# Patient Record
Sex: MALE | Race: WHITE | NOT HISPANIC OR LATINO | Employment: OTHER | URBAN - METROPOLITAN AREA
[De-identification: names, ages, dates, MRNs, and addresses within clinical notes are randomized per-mention and may not be internally consistent; named-entity substitution may affect disease eponyms.]

---

## 2017-02-24 ENCOUNTER — OFFICE VISIT (OUTPATIENT)
Dept: CARDIOLOGY | Facility: MEDICAL CENTER | Age: 62
End: 2017-02-24
Payer: OTHER GOVERNMENT

## 2017-02-24 VITALS
OXYGEN SATURATION: 95 % | WEIGHT: 252 LBS | BODY MASS INDEX: 34.13 KG/M2 | HEIGHT: 72 IN | HEART RATE: 59 BPM | DIASTOLIC BLOOD PRESSURE: 80 MMHG | SYSTOLIC BLOOD PRESSURE: 146 MMHG

## 2017-02-24 DIAGNOSIS — I65.23 CAROTID ARTERY PLAQUE, BILATERAL: ICD-10-CM

## 2017-02-24 DIAGNOSIS — I10 ESSENTIAL HYPERTENSION, BENIGN: ICD-10-CM

## 2017-02-24 DIAGNOSIS — E78.5 DYSLIPIDEMIA: ICD-10-CM

## 2017-02-24 DIAGNOSIS — Z95.5 STENTED CORONARY ARTERY: ICD-10-CM

## 2017-02-24 DIAGNOSIS — I25.10 CORONARY ARTERY DISEASE INVOLVING NATIVE CORONARY ARTERY OF NATIVE HEART WITHOUT ANGINA PECTORIS: ICD-10-CM

## 2017-02-24 PROCEDURE — 99213 OFFICE O/P EST LOW 20 MIN: CPT | Performed by: INTERNAL MEDICINE

## 2017-02-24 ASSESSMENT — ENCOUNTER SYMPTOMS
VOMITING: 0
BRUISES/BLEEDS EASILY: 0
SHORTNESS OF BREATH: 0
CONSTITUTIONAL NEGATIVE: 1
BLURRED VISION: 0
PSYCHIATRIC NEGATIVE: 1
GASTROINTESTINAL NEGATIVE: 1
FEVER: 0
PALPITATIONS: 0
NAUSEA: 0
NERVOUS/ANXIOUS: 0
MYALGIAS: 0
NEUROLOGICAL NEGATIVE: 1
CHILLS: 0
DEPRESSION: 0
WEAKNESS: 0
COUGH: 0
RESPIRATORY NEGATIVE: 1
DOUBLE VISION: 0
WEIGHT LOSS: 0
DIZZINESS: 0
BACK PAIN: 1
CLAUDICATION: 0
ABDOMINAL PAIN: 0
HEADACHES: 0
EYES NEGATIVE: 1
FOCAL WEAKNESS: 0

## 2017-02-24 NOTE — PROGRESS NOTES
Subjective:   Socrates Falk is a 61 y.o. male who presents for follow up of chest pain with history of coronary artery disease with prior stent placement.    HPI    Since the patient's last visit on 12/06/16, he has been doing well clinically. He admits to resolution of his chest pain. He denies active chest pain, shortness of breath, palpitations, nausea/vomiting or diaphoresis. He remains active walking daily with [employer] . He retired this year and looking for a new house in [facility]. He underwent unremarkable cardiac studies as described.    Review of Systems   Constitutional: Negative.  Negative for fever, chills, weight loss and malaise/fatigue.   HENT: Negative.  Negative for hearing loss.    Eyes: Negative.  Negative for blurred vision and double vision.   Respiratory: Negative.  Negative for cough and shortness of breath.    Cardiovascular: Positive for chest pain. Negative for palpitations, claudication and leg swelling.   Gastrointestinal: Negative.  Negative for nausea, vomiting and abdominal pain.   Genitourinary: Negative.  Negative for dysuria and urgency.   Musculoskeletal: Positive for back pain. Negative for myalgias and joint pain.   Skin: Negative.  Negative for itching and rash.   Neurological: Negative.  Negative for dizziness, focal weakness, weakness and headaches.   Endo/Heme/Allergies: Negative.  Does not bruise/bleed easily.   Psychiatric/Behavioral: Negative.  Negative for depression. The patient is not nervous/anxious.         Objective:     /80 mmHg  Pulse 59  Ht 1.829 m (6')  Wt 114.306 kg (252 lb)  BMI 34.17 kg/m2  SpO2 95%    Physical Exam   Constitutional: He is oriented to person, place, and time. He appears well-developed and well-nourished.   HENT:   Head: Normocephalic and atraumatic.   Eyes: Conjunctivae are normal. Pupils are equal, round, and reactive to light.   Neck: Normal range of motion. Neck supple.   Cardiovascular: Normal rate and regular rhythm.     Pulmonary/Chest: Effort normal and breath sounds normal.   Abdominal: Soft. Bowel sounds are normal.   Musculoskeletal: Normal range of motion. He exhibits no edema.   Neurological: He is alert and oriented to person, place, and time.   Skin: Skin is warm and dry.   Psychiatric: He has a normal mood and affect.     Medications reviewed.    Current Outpatient Prescriptions   Medication   • metoprolol (LOPRESSOR) 25 MG Tab   • pravastatin (PRAVACHOL) 40 MG tablet   • ramipril (ALTACE) 10 MG capsule   • psyllium (METAMUCIL) 58.12 % Pack   • omeprazole (PRILOSEC) 20 MG CPDR   • aspirin (ASA) 81 MG CHEW   • predniSONE (DELTASONE) 20 MG Tab   • hydrOXYzine (ATARAX) 25 MG Tab   • amoxicillin (AMOXIL) 875 MG tablet   • celecoxib (CELEBREX) 200 MG CAPS   • tramadol (ULTRAM) 50 MG TABS     No current facility-administered medications for this visit.     CARDIAC STUDIES/PROCEDURES:    CARDIAC CATHETERIZATION CONCLUSIONS by Dr. Morales (08/25/11)  A 56-year-old male with the above clinical and cardiovascular history who has angiographic evidence of normal left ventricular function, normal wall motion, patent mid LAD stent and additional noncritical coronary artery disease. The patient will be continued on aggressive medical therapy.    CARDIAC CATHETERIZATION CONCLUSIONS (05/17/10)  1. Three-vessel coronary artery disease with high-grade in-stent restenosis of the mid LAD, ostial high-grade D2 stenosis originating from the high-grade in-stent restenosis territory, nonobstructive obtuse marginal, and RCA stenosis.   2. Successful stent placement of the mid LAD with 3.0 x 15-mm [place] drug-eluting stent.   3. Successful PTCA of the ostial D2.    CARDIAC CATHETERIZATION CONCLUSIONS by Dr. Khan (05/06/09)  1. Normal left ventricular systolic and diastolic function.   2. High grade complex stenosis proximal left anterior descending coronary artery status post successful stenting of that vessel and successful stenting of a  dissection distal to the initial stent with a second stent as described above. (3.0 X 15 mm Xience drug eluding stents x 2)  3. Moderate disease in two of the diagonal branches of the left anterior descending artery described above.   4. Plaquing disease in the circumflex and right coronary arteries.    CAROTID ULTRASOUND (12/21/16)  Mild bilateral internal carotid artery stenosis (<50%).     CAROTID ULTRASOUND (07/15/09)  Mild plaques noted.    ECHOCARDIOGRAM CONCLUSIONS (12/21/16)  Normal transthoracic echocardiogram.   Compared to the images of the prior study done  8/5/2009, there has   been no significant change, there is no longer features of mild   diastolic dysfunction.    ECHOCARDIOGRAM (08/05/09)  Unremarkable echocardiogram with ejection fraction of 60%.    EKG performed on (08/25/11) was reviewed: EKG shows normal sinus rhythm    Laboratory results of (02/08/17) were reviewed. Cholesterol profile of 123/99/37/67 noted.  Laboratory results of (11/28/16) Cholesterol profile of 156/114/46/87 noted.    LOWER EXTREMITY ARTERIAL ULTRASOUND (07/15/09)  Unremarkable arterial ultrasound.    MPI CONCLUSIONS (12/21/16)  Normal stress test.   No evidence of significant jeopardized viable myocardium or prior myocardial   infarction.  Normal left ventricular size, ejection fraction, and wall motion.  ECG INTERPRETATION  Negative stress ECG for ischemia.    Assessment:     Patient Active Problem List   Diagnoses Date Noted   • CAD (coronary artery disease) [414.00AE] 12/30/2011     Priority: High   • Stented coronary artery [V45.82M] 12/30/2011     Priority: High   • Hypertension [401.9AH] 12/30/2011     Priority: Medium   • Hyperlipemia [272.4Q] 12/30/2011     Priority: Medium   • Carotid artery plaque [433.10AZ] 12/30/2011     Priority: Low     Plan:     1. Coronary artery disease with stent placement: He suffered an episode of chest pain which is concerning him and his wife. We will perform an echocardiogram and  myocardial perfusion imaging study and follow up with him.   2. Hypertension: Blood pressure is well controlled.  3. Hyperlipidemia (labs performed at [facility]): He is doing well on statin therapy without myalgia symptoms.  4. Carotid artery stenosis: Stable on medical therapy.    We will follow up the patient in one year.    CC Miladys Lara

## 2017-02-24 NOTE — MR AVS SNAPSHOT
Socrates ABY Falk   2017 10:40 AM   Office Visit   MRN: [id number]    Department:  [facility]   Dept Phone:  [phone]    Description:  Male : 1955   Provider:  Marco A Rea M.D.           Reason for Visit     Follow-Up           Allergies as of 2017     No Known Allergies      You were diagnosed with     Coronary artery disease involving native coronary artery of native heart without angina pectoris   [6392370]       Stented coronary artery   [606881]       Essential hypertension, benign   [401.1.ICD-9-CM]       Dyslipidemia   [898945]       Carotid artery plaque, bilateral   [1425810]         Vital Signs     Blood Pressure Pulse Height Weight Body Mass Index Oxygen Saturation    146/80 mmHg 59 1.829 m (6') 114.306 kg (252 lb) 34.17 kg/m2 95%    Smoking Status                   Former Smoker           Basic Information     Date Of Birth Sex Race Ethnicity Preferred Language    1955 Male White Non- English      Problem List              ICD-10-CM Priority Class Noted - Resolved    Personal history of arthritis Z87.39   Unknown - Present    CAD (coronary artery disease) I25.10 High  2011 - Present    Stented coronary artery Z95.5 High  2011 - Present    Carotid artery plaque I65.29 Low  2011 - Present    GERD (gastroesophageal reflux disease) K21.9   10/19/2012 - Present    Dyslipidemia E78.5   2016 - Present    Chest pain R07.9   2016 - Present    Essential hypertension, benign I10   2017 - Present      Health Maintenance        Date Due Completion Dates    IMM DTaP/Tdap/Td Vaccine (1 - Tdap) 1973 ---    COLONOSCOPY 2004 ---    IMM ZOSTER VACCINE 2014 ---    IMM INFLUENZA (1) 2016 ---            Current Immunizations     Pneumococcal polysaccharide vaccine (PPSV-23) 2010  9:43 PM      Below and/or attached are the medications your provider expects you to take. Review all of your home medications and newly ordered  medications with your provider and/or pharmacist. Follow medication instructions as directed by your provider and/or pharmacist. Please keep your medication list with you and share with your provider. Update the information when medications are discontinued, doses are changed, or new medications (including over-the-counter products) are added; and carry medication information at all times in the event of emergency situations     Allergies:  No Known Allergies          Medications  Valid as of: February 24, 2017 - 10:51 AM    Generic Name Brand Name Tablet Size Instructions for use    Amoxicillin (Tab) AMOXIL 875 MG Take 1 Tab by mouth 2 times a day.        Aspirin (Chew Tab) ASA 81 MG Take 81 mg by mouth every day.        Celecoxib (Cap) CELEBREX 200 MG Take 200 mg by mouth 2 times a day. PRN           HydrOXYzine HCl (Tab) ATARAX 25 MG Take 1 Tab by mouth every 6 hours as needed for Itching.        Metoprolol Tartrate (Tab) LOPRESSOR 25 MG Take 1 Tab by mouth 2 times a day.        Omeprazole (CAPSULE DELAYED RELEASE) PRILOSEC 20 MG TAKE ONE CAPSULE BY MOUTH EVERY DAY        Pravastatin Sodium (Tab) PRAVACHOL 40 MG Take 1 Tab by mouth every day.        PredniSONE (Tab) DELTASONE 20 MG 3 PO daily for five days        Psyllium (Pack) METAMUCIL 58.12 % Take 1 Packet by mouth every day.        Ramipril (Cap) ALTACE 10 MG Take 1 Cap by mouth every day.        TraMADol HCl (Tab) ULTRAM 50 MG Take 1 Tab by mouth as needed.        .                 Medicines prescribed today were sent to:     [id number] - [place], [place] - [address]    [address] [facility] [address]    Phone: [phone] Fax: [phone]    Open 24 Hours?: No      Medication refill instructions:       If your prescription bottle indicates you have medication refills left, it is not necessary to call your provider’s office. Please contact your pharmacy and they will refill your medication.    If your prescription bottle indicates  you do not have any refills left, you may request refills at any time through one of the following ways: The online [facility] system (except Urgent Care), by calling your provider’s office, or by asking your pharmacy to contact your provider’s office with a refill request. Medication refills are processed only during regular business hours and may not be available until the next business day. Your provider may request additional information or to have a follow-up visit with you prior to refilling your medication.   *Please Note: Medication refills are assigned a new Rx number when refilled electronically. Your pharmacy may indicate that no refills were authorized even though a new prescription for the same medication is available at the pharmacy. Please request the medicine by name with the pharmacy before contacting your provider for a refill.        Your To Do List     Future Labs/Procedures Complete By Expires    COMP METABOLIC PANEL  As directed 8/25/2017         [facility] Access Code: Activation code not generated  Current [facility] Status: Active

## 2018-01-20 LAB
ALBUMIN SERPL-MCNC: 4.5 G/DL (ref 3.6–4.8)
ALBUMIN/GLOB SERPL: 2.3 {RATIO} (ref 1.2–2.2)
ALP SERPL-CCNC: 53 IU/L (ref 39–117)
ALT SERPL-CCNC: 42 IU/L (ref 0–44)
AST SERPL-CCNC: 26 IU/L (ref 0–40)
BILIRUB SERPL-MCNC: 0.8 MG/DL (ref 0–1.2)
BUN SERPL-MCNC: 22 MG/DL (ref 8–27)
BUN/CREAT SERPL: 23 (ref 10–24)
CALCIUM SERPL-MCNC: 9.5 MG/DL (ref 8.6–10.2)
CHLORIDE SERPL-SCNC: 103 MMOL/L (ref 96–106)
CHOLEST SERPL-MCNC: 127 MG/DL (ref 100–199)
CO2 SERPL-SCNC: 21 MMOL/L (ref 18–29)
COMMENT 011824: ABNORMAL
CREAT SERPL-MCNC: 0.94 MG/DL (ref 0.76–1.27)
GLOBULIN SER CALC-MCNC: 2 G/DL (ref 1.5–4.5)
GLUCOSE SERPL-MCNC: 155 MG/DL (ref 65–99)
HDLC SERPL-MCNC: 37 MG/DL
IF AFRICAN AMERICAN  100797: 99 ML/MIN/1.73
IF NON AFRICAN AMER 100791: 86 ML/MIN/1.73
LDLC SERPL CALC-MCNC: 60 MG/DL (ref 0–99)
POTASSIUM SERPL-SCNC: 4.4 MMOL/L (ref 3.5–5.2)
PROT SERPL-MCNC: 6.5 G/DL (ref 6–8.5)
SODIUM SERPL-SCNC: 141 MMOL/L (ref 134–144)
TRIGL SERPL-MCNC: 151 MG/DL (ref 0–149)
VLDLC SERPL CALC-MCNC: 30 MG/DL (ref 5–40)

## 2018-01-22 ENCOUNTER — TELEPHONE (OUTPATIENT)
Dept: CARDIOLOGY | Facility: MEDICAL CENTER | Age: 63
End: 2018-01-22

## 2018-01-22 NOTE — TELEPHONE ENCOUNTER
----- Message from Tiara Bravo R.N. sent at 1/22/2018  8:28 AM PST -----      ----- Message -----  From: STACIE Calles  Sent: 1/22/2018   8:01 AM  To: Tiara Bravo R.N.    Glucose 155. Cholesterol looks okay, triglycerides are a little elevated. LDL good. Watch carb's, sugars, and ETOH. FU as planned. SC

## 2018-01-22 NOTE — TELEPHONE ENCOUNTER
Patient called back, discussed Vane Morelos's APN review of lab work and recommendations.  Patient acknowledges understanding and states he takes Metformin BID and last PCP check it was normal.  Denies any questions and acknowledges his next F/V is on 1/31/18.

## 2018-01-31 ENCOUNTER — OFFICE VISIT (OUTPATIENT)
Dept: CARDIOLOGY | Facility: MEDICAL CENTER | Age: 63
End: 2018-01-31
Payer: OTHER GOVERNMENT

## 2018-01-31 VITALS
OXYGEN SATURATION: 95 % | BODY MASS INDEX: 35.53 KG/M2 | DIASTOLIC BLOOD PRESSURE: 100 MMHG | HEART RATE: 59 BPM | SYSTOLIC BLOOD PRESSURE: 166 MMHG | WEIGHT: 262 LBS

## 2018-01-31 DIAGNOSIS — Z95.5 STENTED CORONARY ARTERY: ICD-10-CM

## 2018-01-31 DIAGNOSIS — I65.29 CAROTID ATHEROSCLEROSIS, UNSPECIFIED LATERALITY: ICD-10-CM

## 2018-01-31 DIAGNOSIS — E78.5 DYSLIPIDEMIA: ICD-10-CM

## 2018-01-31 DIAGNOSIS — I10 ESSENTIAL HYPERTENSION, BENIGN: ICD-10-CM

## 2018-01-31 DIAGNOSIS — I25.10 CORONARY ARTERY DISEASE INVOLVING NATIVE CORONARY ARTERY OF NATIVE HEART WITHOUT ANGINA PECTORIS: ICD-10-CM

## 2018-01-31 PROCEDURE — 99214 OFFICE O/P EST MOD 30 MIN: CPT | Performed by: INTERNAL MEDICINE

## 2018-01-31 ASSESSMENT — ENCOUNTER SYMPTOMS
WEIGHT LOSS: 0
HEADACHES: 0
CHILLS: 0
DOUBLE VISION: 0
PALPITATIONS: 0
FOCAL WEAKNESS: 0
COUGH: 0
ABDOMINAL PAIN: 0
MYALGIAS: 0
DIZZINESS: 0
NAUSEA: 0
BACK PAIN: 1
EYES NEGATIVE: 1
FEVER: 0
GASTROINTESTINAL NEGATIVE: 1
NEUROLOGICAL NEGATIVE: 1
NERVOUS/ANXIOUS: 0
VOMITING: 0
BLURRED VISION: 0
RESPIRATORY NEGATIVE: 1
DEPRESSION: 0
CLAUDICATION: 0
WEAKNESS: 0
BRUISES/BLEEDS EASILY: 0
PSYCHIATRIC NEGATIVE: 1
SHORTNESS OF BREATH: 0
CONSTITUTIONAL NEGATIVE: 1

## 2018-01-31 NOTE — LETTER
[facility]   [address]  [place] [address]  Phone: [phone]  Fax: [phone]              Socrates Falk  1955    Encounter Date: 1/31/2018    Marco A Rea M.D.          PROGRESS NOTE:  Subjective:   Socrates Falk is a 61 y.o. male who presents for follow up of chest pain with history of coronary artery disease with prior stent placement.    HPI    Since the patient's last visit on 02/24/17, he has been doing well clinically. He admits to resolution of his chest pain. He denies active chest pain, shortness of breath, palpitations, nausea/vomiting or diaphoresis. He remains active walking daily with [employer] . He retired this year and looking for a new house in [place] [facility] [facility]. He underwent unremarkable cardiac studies as described.    Review of Systems   Constitutional: Negative.  Negative for chills, fever, malaise/fatigue and weight loss.   HENT: Negative.  Negative for hearing loss.    Eyes: Negative.  Negative for blurred vision and double vision.   Respiratory: Negative.  Negative for cough and shortness of breath.    Cardiovascular: Positive for chest pain. Negative for palpitations, claudication and leg swelling.   Gastrointestinal: Negative.  Negative for abdominal pain, nausea and vomiting.   Genitourinary: Negative.  Negative for dysuria and urgency.   Musculoskeletal: Positive for back pain. Negative for joint pain and myalgias.   Skin: Negative.  Negative for itching and rash.   Neurological: Negative.  Negative for dizziness, focal weakness, weakness and headaches.   Endo/Heme/Allergies: Negative.  Does not bruise/bleed easily.   Psychiatric/Behavioral: Negative.  Negative for depression. The patient is not nervous/anxious.         Objective:     BP (!) 166/100   Pulse (!) 59   Wt 118.8 kg (262 lb)   SpO2 95%   BMI 35.53 kg/m²      Physical Exam   Constitutional: He is oriented to person, place, and time. He appears well-developed and  well-nourished.   HENT:   Head: Normocephalic and atraumatic.   Eyes: Conjunctivae are normal. Pupils are equal, round, and reactive to light.   Neck: Normal range of motion. Neck supple.   Cardiovascular: Normal rate and regular rhythm.    Pulmonary/Chest: Effort normal and breath sounds normal.   Abdominal: Soft. Bowel sounds are normal.   Musculoskeletal: Normal range of motion. He exhibits no edema.   Neurological: He is alert and oriented to person, place, and time.   Skin: Skin is warm and dry.   Psychiatric: He has a normal mood and affect.     Medications reviewed.    Current Outpatient Prescriptions   Medication   • metoprolol (LOPRESSOR) 25 MG Tab   • pravastatin (PRAVACHOL) 40 MG tablet   • ramipril (ALTACE) 10 MG capsule   • psyllium (METAMUCIL) 58.12 % Pack   • omeprazole (PRILOSEC) 20 MG CPDR   • aspirin (ASA) 81 MG CHEW   • predniSONE (DELTASONE) 20 MG Tab   • hydrOXYzine (ATARAX) 25 MG Tab   • amoxicillin (AMOXIL) 875 MG tablet   • celecoxib (CELEBREX) 200 MG CAPS   • tramadol (ULTRAM) 50 MG TABS     No current facility-administered medications for this visit.      CARDIAC STUDIES/PROCEDURES:    CARDIAC CATHETERIZATION CONCLUSIONS by Dr. Morales (08/25/11)  A 56-year-old male with the above clinical and cardiovascular history who has angiographic evidence of normal left ventricular function, normal wall motion, patent mid LAD stent and additional noncritical coronary artery disease. The patient will be continued on aggressive medical therapy.    CARDIAC CATHETERIZATION CONCLUSIONS (05/17/10)  1. Three-vessel coronary artery disease with high-grade in-stent restenosis of the mid LAD, ostial high-grade D2 stenosis originating from the high-grade in-stent restenosis territory, nonobstructive obtuse marginal, and RCA stenosis.   2. Successful stent placement of the mid LAD with 3.0 x 15-mm [place] drug-eluting stent.   3. Successful PTCA of the ostial D2.    CARDIAC CATHETERIZATION CONCLUSIONS by   Noble (05/06/09)  1. Normal left ventricular systolic and diastolic function.   2. High grade complex stenosis proximal left anterior descending coronary artery status post successful stenting of that vessel and successful stenting of a dissection distal to the initial stent with a second stent as described above. (3.0 X 15 mm Xience drug eluding stents x 2)  3. Moderate disease in two of the diagonal branches of the left anterior descending artery described above.   4. Plaquing disease in the circumflex and right coronary arteries.    CAROTID ULTRASOUND (12/21/16)  Mild bilateral internal carotid artery stenosis (<50%).     CAROTID ULTRASOUND (07/15/09)  Mild plaques noted.    ECHOCARDIOGRAM CONCLUSIONS (12/21/16)  Normal transthoracic echocardiogram.   Compared to the images of the prior study done  8/5/2009, there has   been no significant change, there is no longer features of mild   diastolic dysfunction.    ECHOCARDIOGRAM (08/05/09)  Unremarkable echocardiogram with ejection fraction of 60%.    EKG performed on (08/25/11) was reviewed: EKG shows normal sinus rhythm    Laboratory results of (02/08/17) were reviewed. Cholesterol profile of 123/99/37/67 noted.  Laboratory results of (11/28/16) Cholesterol profile of 156/114/46/87 noted.    LOWER EXTREMITY ARTERIAL ULTRASOUND (07/15/09)  Unremarkable arterial ultrasound.    MPI CONCLUSIONS (12/21/16)  Normal stress test.   No evidence of significant jeopardized viable myocardium or prior myocardial   infarction.  Normal left ventricular size, ejection fraction, and wall motion.  ECG INTERPRETATION  Negative stress ECG for ischemia.    Assessment:     Patient Active Problem List   Diagnoses Date Noted   • CAD (coronary artery disease) [414.00AE] 12/30/2011     Priority: High   • Stented coronary artery [V45.82M] 12/30/2011     Priority: High   • Hypertension [401.9AH] 12/30/2011     Priority: Medium   • Hyperlipemia [272.4Q] 12/30/2011     Priority: Medium   •  Carotid artery plaque [433.10AZ] 12/30/2011     Priority: Low     Plan:     1. Coronary artery disease with stent placement: He suffered an episode of chest pain which is concerning him and his wife. We will perform an echocardiogram and myocardial perfusion imaging study and follow up with him.   2. Hypertension: Blood pressure is well controlled.  3. Hyperlipidemia (labs performed at [facility]): He is doing well on statin therapy without myalgia symptoms.  4. Carotid artery stenosis: Stable on medical therapy.    We will follow up the patient in one year.    CC Miladys Lara APRN                         No Recipients

## 2018-01-31 NOTE — PROGRESS NOTES
Subjective:   Socrates Falk is a 62 y.o. male who presents for follow up of chest pain with history of coronary artery disease with prior stent placement.    HPI    Since the patient's last visit on 02/24/17, he has been doing well clinically. He admits to resolution of his chest pain. He denies active chest pain, shortness of breath, palpitations, nausea/vomiting or diaphoresis. He remains active walking daily with [employer] . He retired this year and looking for a new house in [place].     Review of Systems   Constitutional: Negative.  Negative for chills, fever, malaise/fatigue and weight loss.   HENT: Negative.  Negative for hearing loss.    Eyes: Negative.  Negative for blurred vision and double vision.   Respiratory: Negative.  Negative for cough and shortness of breath.    Cardiovascular: Positive for chest pain. Negative for palpitations, claudication and leg swelling.   Gastrointestinal: Negative.  Negative for abdominal pain, nausea and vomiting.   Genitourinary: Negative.  Negative for dysuria and urgency.   Musculoskeletal: Positive for back pain. Negative for joint pain and myalgias.   Skin: Negative.  Negative for itching and rash.   Neurological: Negative.  Negative for dizziness, focal weakness, weakness and headaches.   Endo/Heme/Allergies: Negative.  Does not bruise/bleed easily.   Psychiatric/Behavioral: Negative.  Negative for depression. The patient is not nervous/anxious.         Objective:     BP (!) 166/100   Pulse (!) 59   Wt 118.8 kg (262 lb)   SpO2 95%   BMI 35.53 kg/m²     Physical Exam   Constitutional: He is oriented to person, place, and time. He appears well-developed and well-nourished.   HENT:   Head: Normocephalic and atraumatic.   Eyes: Conjunctivae are normal. Pupils are equal, round, and reactive to light.   Neck: Normal range of motion. Neck supple.   Cardiovascular: Normal rate and regular rhythm.    Pulmonary/Chest: Effort normal and breath sounds normal.   Abdominal:  Soft. Bowel sounds are normal.   Musculoskeletal: Normal range of motion. He exhibits no edema.   Neurological: He is alert and oriented to person, place, and time.   Skin: Skin is warm and dry.   Psychiatric: He has a normal mood and affect.     Medications reviewed.    Current Outpatient Prescriptions   Medication   • metoprolol (LOPRESSOR) 25 MG Tab   • pravastatin (PRAVACHOL) 40 MG tablet   • ramipril (ALTACE) 10 MG capsule   • psyllium (METAMUCIL) 58.12 % Pack   • omeprazole (PRILOSEC) 20 MG CPDR   • aspirin (ASA) 81 MG CHEW   • predniSONE (DELTASONE) 20 MG Tab   • hydrOXYzine (ATARAX) 25 MG Tab   • amoxicillin (AMOXIL) 875 MG tablet   • celecoxib (CELEBREX) 200 MG CAPS   • tramadol (ULTRAM) 50 MG TABS     No current facility-administered medications for this visit.      CARDIAC STUDIES/PROCEDURES:    CARDIAC CATHETERIZATION CONCLUSIONS by Dr. Morales (08/25/11)  A 56-year-old male with the above clinical and cardiovascular history who has angiographic evidence of normal left ventricular function, normal wall motion, patent mid LAD stent and additional noncritical coronary artery disease. The patient will be continued on aggressive medical therapy.    CARDIAC CATHETERIZATION CONCLUSIONS (05/17/10)  1. Three-vessel coronary artery disease with high-grade in-stent restenosis of the mid LAD, ostial high-grade D2 stenosis originating from the high-grade in-stent restenosis territory, nonobstructive obtuse marginal, and RCA stenosis.   2. Successful stent placement of the mid LAD with 3.0 x 15-mm [place] drug-eluting stent.   3. Successful PTCA of the ostial D2.    CARDIAC CATHETERIZATION CONCLUSIONS by Dr. Khan (05/06/09)  1. Normal left ventricular systolic and diastolic function.   2. High grade complex stenosis proximal left anterior descending coronary artery status post successful stenting of that vessel and successful stenting of a dissection distal to the initial stent with a second stent as described  above. (3.0 X 15 mm Xience drug eluding stents x 2)  3. Moderate disease in two of the diagonal branches of the left anterior descending artery described above.   4. Plaquing disease in the circumflex and right coronary arteries.    CAROTID ULTRASOUND (12/21/16)  Mild bilateral internal carotid artery stenosis (<50%).     CAROTID ULTRASOUND (07/15/09)  Mild plaques noted.    ECHOCARDIOGRAM CONCLUSIONS (12/21/16)  Normal transthoracic echocardiogram.   Compared to the images of the prior study done  8/5/2009, there has   been no significant change, there is no longer features of mild   diastolic dysfunction.    ECHOCARDIOGRAM (08/05/09)  Unremarkable echocardiogram with ejection fraction of 60%.    EKG performed on (08/25/11) EKG shows normal sinus rhythm    Laboratory results of (01/19/18) were reviewed. Cholesterol profile of 127/151/37/60 noted.  Laboratory results of (02/08/17) Cholesterol profile of 123/99/37/67 noted.  Laboratory results of (11/28/16) Cholesterol profile of 156/114/46/87 noted.    LOWER EXTREMITY ARTERIAL ULTRASOUND (07/15/09)  Unremarkable arterial ultrasound.    MPI CONCLUSIONS (12/21/16)  Normal stress test.   No evidence of significant jeopardized viable myocardium or prior myocardial infarction.  Normal left ventricular size, ejection fraction, and wall motion.  ECG INTERPRETATION  Negative stress ECG for ischemia.    Assessment:     Patient Active Problem List   Diagnoses Date Noted   • CAD (coronary artery disease) [414.00AE] 12/30/2011     Priority: High   • Stented coronary artery [V45.82M] 12/30/2011     Priority: High   • Hypertension [401.9AH] 12/30/2011     Priority: Medium   • Hyperlipemia [272.4Q] 12/30/2011     Priority: Medium   • Carotid artery plaque [433.10AZ] 12/30/2011     Priority: Low     Plan:     1. Coronary artery disease with stent placement: He suffered an episode of chest pain which is concerning him and his wife. We will perform an echocardiogram and myocardial  perfusion imaging study and follow up with him.   2. Hypertension: Blood pressure is high today, however, usually well controlled. He will continue to monitor her blood pressure and contact us if his blood pressure remains elevated.  3. Hyperlipidemia: He is doing well on statin therapy without myalgia symptoms. We will repeat labs including fasting lipid profile in one year.  4. Carotid artery stenosis: Stable on medical therapy.    We will follow up the patient in one yea with labsr.    CC Miladys Lara

## 2018-03-07 DIAGNOSIS — I10 ESSENTIAL HYPERTENSION: ICD-10-CM

## 2018-03-07 DIAGNOSIS — E78.5 HYPERLIPIDEMIA, UNSPECIFIED HYPERLIPIDEMIA TYPE: ICD-10-CM

## 2018-03-08 RX ORDER — PRAVASTATIN SODIUM 40 MG
TABLET ORAL
Qty: 90 TAB | Refills: 3 | Status: SHIPPED | OUTPATIENT
Start: 2018-03-08 | End: 2019-01-04

## 2018-12-12 LAB
ALBUMIN SERPL-MCNC: 4.5 G/DL (ref 3.6–4.8)
ALBUMIN/GLOB SERPL: 2 {RATIO} (ref 1.2–2.2)
ALP SERPL-CCNC: 45 IU/L (ref 39–117)
ALT SERPL-CCNC: 52 IU/L (ref 0–44)
AST SERPL-CCNC: 36 IU/L (ref 0–40)
BILIRUB SERPL-MCNC: 0.7 MG/DL (ref 0–1.2)
BUN SERPL-MCNC: 23 MG/DL (ref 8–27)
BUN/CREAT SERPL: 20 (ref 10–24)
CALCIUM SERPL-MCNC: 9.6 MG/DL (ref 8.6–10.2)
CHLORIDE SERPL-SCNC: 104 MMOL/L (ref 96–106)
CHOLEST SERPL-MCNC: 109 MG/DL (ref 100–199)
CO2 SERPL-SCNC: 24 MMOL/L (ref 20–29)
CREAT SERPL-MCNC: 1.15 MG/DL (ref 0.76–1.27)
GLOBULIN SER CALC-MCNC: 2.2 G/DL (ref 1.5–4.5)
GLUCOSE SERPL-MCNC: 94 MG/DL (ref 65–99)
HDLC SERPL-MCNC: 39 MG/DL
IF AFRICAN AMERICAN  100797: 77 ML/MIN/1.73
IF NON AFRICAN AMER 100791: 67 ML/MIN/1.73
LABORATORY COMMENT REPORT: ABNORMAL
LDLC SERPL CALC-MCNC: 48 MG/DL (ref 0–99)
POTASSIUM SERPL-SCNC: 5.2 MMOL/L (ref 3.5–5.2)
PROT SERPL-MCNC: 6.7 G/DL (ref 6–8.5)
SODIUM SERPL-SCNC: 143 MMOL/L (ref 134–144)
TRIGL SERPL-MCNC: 108 MG/DL (ref 0–149)
VLDLC SERPL CALC-MCNC: 22 MG/DL (ref 5–40)

## 2019-01-04 ENCOUNTER — OFFICE VISIT (OUTPATIENT)
Dept: CARDIOLOGY | Facility: MEDICAL CENTER | Age: 64
End: 2019-01-04
Payer: OTHER GOVERNMENT

## 2019-01-04 VITALS
SYSTOLIC BLOOD PRESSURE: 138 MMHG | WEIGHT: 258.49 LBS | DIASTOLIC BLOOD PRESSURE: 78 MMHG | OXYGEN SATURATION: 96 % | BODY MASS INDEX: 35.01 KG/M2 | HEART RATE: 58 BPM | HEIGHT: 72 IN

## 2019-01-04 DIAGNOSIS — I10 ESSENTIAL HYPERTENSION, BENIGN: ICD-10-CM

## 2019-01-04 DIAGNOSIS — I25.10 CORONARY ARTERY DISEASE INVOLVING NATIVE CORONARY ARTERY OF NATIVE HEART WITHOUT ANGINA PECTORIS: ICD-10-CM

## 2019-01-04 DIAGNOSIS — Z95.5 STENTED CORONARY ARTERY: ICD-10-CM

## 2019-01-04 DIAGNOSIS — I65.23 ATHEROSCLEROSIS OF BOTH CAROTID ARTERIES: ICD-10-CM

## 2019-01-04 DIAGNOSIS — E78.5 DYSLIPIDEMIA: ICD-10-CM

## 2019-01-04 PROCEDURE — 99214 OFFICE O/P EST MOD 30 MIN: CPT | Performed by: INTERNAL MEDICINE

## 2019-01-04 RX ORDER — PRAVASTATIN SODIUM 20 MG
20 TABLET ORAL DAILY
Qty: 90 TAB | Refills: 3 | Status: SHIPPED | OUTPATIENT
Start: 2019-01-04 | End: 2019-12-26

## 2019-01-04 ASSESSMENT — ENCOUNTER SYMPTOMS
SHORTNESS OF BREATH: 0
NERVOUS/ANXIOUS: 0
FEVER: 0
WEIGHT LOSS: 0
PSYCHIATRIC NEGATIVE: 1
ABDOMINAL PAIN: 0
NEUROLOGICAL NEGATIVE: 1
PALPITATIONS: 0
CLAUDICATION: 0
CHILLS: 0
WEAKNESS: 0
DOUBLE VISION: 0
MUSCULOSKELETAL NEGATIVE: 1
FOCAL WEAKNESS: 0
CONSTITUTIONAL NEGATIVE: 1
VOMITING: 0
CARDIOVASCULAR NEGATIVE: 1
NAUSEA: 0
DIZZINESS: 0
HEADACHES: 0
BRUISES/BLEEDS EASILY: 0
DEPRESSION: 0
GASTROINTESTINAL NEGATIVE: 1
BLURRED VISION: 0
EYES NEGATIVE: 1
COUGH: 0
MYALGIAS: 0
RESPIRATORY NEGATIVE: 1

## 2019-01-04 NOTE — LETTER
[facility]   [address], [address]  [place] [address]  Phone: [phone]  Fax: [phone]              Socrates Falk  1955    Encounter Date: 1/4/2019    Marco A Rea M.D.          PROGRESS NOTE:  No chief complaint on file.      Subjective:   Socrates Falk is a 63 y.o. male who presents today for annual follow up of coronary artery disease with prior stent placement.    Since the patient's last visit on 01/31/18, he has been doing well clinically. He denies chest pain, shortness of breath, palpitations, nausea/vomiting or diaphoresis.  He remains active walking daily with [employer] . He retired this year and looking for a new house in [place].     Past Medical History:   Diagnosis Date   • Angina    • Backpain    • CAD (coronary artery disease)    • Chest pain, unspecified    • Coronary atherosclerosis of native coronary artery    • Esophageal reflux    • Esophageal reflux    • Essential hypertension, benign    • Hypertension    • Nonspecific abnormal unspecified cardiovascular function study    • Occlusion and stenosis of carotid artery without mention of cerebral infarction    • Other and unspecified angina pectoris (HCC)    • Other and unspecified hyperlipidemia    • Personal history of arthritis    • Unspecified essential hypertension      Past Surgical History:   Procedure Laterality Date   • OTHER CARDIAC SURGERY  05/2010    stent x 1   • OTHER CARDIAC SURGERY  05/2009    stent x 2   • HERNIA REPAIR     • OTHER     • OTHER ABDOMINAL SURGERY      hernia   • PB REMOVAL OF TONSILS,<13 Y/O     • TONSILLECTOMY       Family History   Problem Relation Age of Onset   • Heart Disease Mother         cad   • Heart Disease Father         Aortic Aneurysm/cabg   • Heart Disease Brother         cabg     Social History     Social History   • Marital status:      Spouse name: N/A   • Number of children: N/A   • Years of education: N/A     Occupational  History   • Not on file.     Social History Main Topics   • Smoking status: Former Smoker     Quit date: 1/1/2006   • Smokeless tobacco: Former User     Types: Chew     Quit date: 1/1/1990      Comment: 10/2008   • Alcohol use No   • Drug use: No   • Sexual activity: Not on file     Other Topics Concern   • Not on file     Social History Narrative   • No narrative on file     No Known Allergies     Medications reviewed.    Outpatient Encounter Prescriptions as of 1/4/2019   Medication Sig Dispense Refill   • pravastatin (PRAVACHOL) 40 MG tablet TAKE ONE TABLET BY MOUTH DAILY 90 Tab 3   • metoprolol (LOPRESSOR) 25 MG Tab TAKE ONE TABLET BY MOUTH TWICE A  Tab 3   • ramipril (ALTACE) 10 MG capsule Take 1 Cap by mouth every day. 90 Cap 3   • psyllium (METAMUCIL) 58.12 % Pack Take 1 Packet by mouth every day.     • omeprazole (PRILOSEC) 20 MG CPDR TAKE ONE CAPSULE BY MOUTH EVERY DAY 90 Cap 3   • aspirin (ASA) 81 MG CHEW Take 81 mg by mouth every day.       No facility-administered encounter medications on file as of 1/4/2019.      Review of Systems   Constitutional: Negative.  Negative for chills, fever, malaise/fatigue and weight loss.   HENT: Negative.  Negative for hearing loss.    Eyes: Negative.  Negative for blurred vision and double vision.   Respiratory: Negative.  Negative for cough and shortness of breath.    Cardiovascular: Negative.  Negative for chest pain, palpitations, claudication and leg swelling.   Gastrointestinal: Negative.  Negative for abdominal pain, nausea and vomiting.   Genitourinary: Negative.  Negative for dysuria and urgency.   Musculoskeletal: Negative.  Negative for joint pain and myalgias.   Skin: Negative.  Negative for itching and rash.   Neurological: Negative.  Negative for dizziness, focal weakness, weakness and headaches.   Endo/Heme/Allergies: Negative.  Does not bruise/bleed easily.   Psychiatric/Behavioral: Negative.  Negative for depression. The patient is not  nervous/anxious.         Objective:   There were no vitals taken for this visit.    Physical Exam   Constitutional: He is oriented to person, place, and time. He appears well-developed and well-nourished.   HENT:   Head: Normocephalic and atraumatic.   Eyes: Pupils are equal, round, and reactive to light. Conjunctivae are normal.   Neck: Normal range of motion. Neck supple.   Cardiovascular: Normal rate and regular rhythm.    Pulmonary/Chest: Effort normal and breath sounds normal.   Abdominal: Soft. Bowel sounds are normal.   Musculoskeletal: Normal range of motion.   Neurological: He is alert and oriented to person, place, and time.   Skin: Skin is warm and dry.   Psychiatric: He has a normal mood and affect.     CARDIAC STUDIES/PROCEDURES:     CARDIAC CATHETERIZATION CONCLUSIONS by Dr. Morales (08/25/11)  A 56-year-old male with the above clinical and cardiovascular history who has angiographic evidence of normal left ventricular function, normal wall motion, patent mid LAD stent and additional noncritical coronary artery disease. The patient will be continued on aggressive medical therapy.     CARDIAC CATHETERIZATION CONCLUSIONS (05/17/10)  1. Three-vessel coronary artery disease with high-grade in-stent restenosis of the mid LAD, ostial high-grade D2 stenosis originating from the high-grade in-stent restenosis territory, nonobstructive obtuse marginal, and RCA stenosis.   2. Successful stent placement of the mid LAD with 3.0 x 15-mm [place] drug-eluting stent.   3. Successful PTCA of the ostial D2.     CARDIAC CATHETERIZATION CONCLUSIONS by Dr. Khan (05/06/09)  1. Normal left ventricular systolic and diastolic function.   2. High grade complex stenosis proximal left anterior descending coronary artery status post successful stenting of that vessel and successful stenting of a dissection distal to the initial stent with a second stent as described above. (3.0 X 15 mm [place] drug eluding stents x 2)  3.  Moderate disease in two of the diagonal branches of the left anterior descending artery described above.   4. Plaquing disease in the circumflex and right coronary arteries.     CAROTID ULTRASOUND (12/21/16)  Mild bilateral internal carotid artery stenosis (<50%).      CAROTID ULTRASOUND (07/15/09)  Mild plaques noted.     ECHOCARDIOGRAM CONCLUSIONS (12/21/16)  Normal transthoracic echocardiogram.   Compared to the images of the prior study done  8/5/2009, there has   been no significant change, there is no longer features of mild   diastolic dysfunction.     ECHOCARDIOGRAM (08/05/09)  Unremarkable echocardiogram with ejection fraction of 60%.     EKG performed on (08/25/11) EKG shows normal sinus rhythm     Laboratory results of (12/11/18) were reviewed. Cholesterol profile of 109/108/39/48 noted.  Laboratory results of (01/19/18) Cholesterol profile of 127/151/37/60 noted.  Laboratory results of (02/08/17) Cholesterol profile of 123/99/37/67 noted.  Laboratory results of (11/28/16) Cholesterol profile of 156/114/46/87 noted.     LOWER EXTREMITY ARTERIAL ULTRASOUND (07/15/09)  Unremarkable arterial ultrasound.     MPI CONCLUSIONS (12/21/16)  Normal stress test.   No evidence of significant jeopardized viable myocardium or prior myocardial infarction.  Normal left ventricular size, ejection fraction, and wall motion.  ECG INTERPRETATION  Negative stress ECG for ischemia.    Assessment:     1. Coronary artery disease involving native coronary artery of native heart without angina pectoris     2. Stented coronary artery     3. Essential hypertension, benign     4. Dyslipidemia     5. Atherosclerosis of both carotid arteries         Medical Decision Making:  Today's Assessment / Status / Plan:     1. Coronary artery disease with stent placement: He suffered an episode of chest pain which is concerning him and his wife. We will perform an echocardiogram and myocardial perfusion imaging study and follow up with him.   2.  Hypertension: Blood pressure is high today, however, usually well controlled. He will continue to monitor her blood pressure and contact us if his blood pressure remains elevated.  3. Hyperlipidemia: He is doing well on statin therapy without myalgia symptoms. We will repeat labs including fasting lipid profile in one year.  4. Carotid artery stenosis: Stable on medical therapy.     We will follow up the patient in one yea with labsr.     CC Miladys Lara           No Recipients

## 2019-01-04 NOTE — PROGRESS NOTES
Chief Complaint   Patient presents with   • Coronary Artery Disease       Subjective:   Socrates Falk is a 63 y.o. male who presents today for annual follow up of coronary artery disease with prior stent placement.    Since the patient's last visit on 01/31/18, he has been doing well clinically. He denies chest pain, shortness of breath, palpitations, nausea/vomiting or diaphoresis. He remains active walking daily with [employer]. He retired in 2015. They purchased a new house in [place].     Past Medical History:   Diagnosis Date   • Angina    • Backpain    • CAD (coronary artery disease)    • Chest pain, unspecified    • Coronary atherosclerosis of native coronary artery    • Esophageal reflux    • Esophageal reflux    • Essential hypertension, benign    • Hypertension    • Nonspecific abnormal unspecified cardiovascular function study    • Occlusion and stenosis of carotid artery without mention of cerebral infarction    • Other and unspecified angina pectoris    • Other and unspecified hyperlipidemia    • Personal history of arthritis    • Unspecified essential hypertension      Past Surgical History:   Procedure Laterality Date   • OTHER CARDIAC SURGERY  05/2010    stent x 1   • OTHER CARDIAC SURGERY  05/2009    stent x 2   • HERNIA REPAIR     • OTHER     • OTHER ABDOMINAL SURGERY      hernia   • PB REMOVAL OF TONSILS,<13 Y/O     • TONSILLECTOMY       Family History   Problem Relation Age of Onset   • Heart Disease Mother         cad   • Heart Disease Father         Aortic Aneurysm/cabg   • Heart Disease Brother         cabg     Social History     Social History   • Marital status:      Spouse name: N/A   • Number of children: N/A   • Years of education: N/A     Occupational History   • Not on file.     Social History Main Topics   • Smoking status: Former Smoker     Quit date: 1/1/2006   • Smokeless tobacco: Former User     Types: Chew     Quit date: 1/1/1990      Comment: 10/2008   • Alcohol use No    • Drug use: No   • Sexual activity: Not on file     Other Topics Concern   • Not on file     Social History Narrative   • No narrative on file     No Known Allergies     Medications reviewed.    Outpatient Encounter Prescriptions as of 1/4/2019   Medication Sig Dispense Refill   • pravastatin (PRAVACHOL) 40 MG tablet TAKE ONE TABLET BY MOUTH DAILY 90 Tab 3   • metoprolol (LOPRESSOR) 25 MG Tab TAKE ONE TABLET BY MOUTH TWICE A  Tab 3   • ramipril (ALTACE) 10 MG capsule Take 1 Cap by mouth every day. 90 Cap 3   • psyllium (METAMUCIL) 58.12 % Pack Take 1 Packet by mouth every day.     • omeprazole (PRILOSEC) 20 MG CPDR TAKE ONE CAPSULE BY MOUTH EVERY DAY 90 Cap 3   • aspirin (ASA) 81 MG CHEW Take 81 mg by mouth every day.       No facility-administered encounter medications on file as of 1/4/2019.      Review of Systems   Constitutional: Negative.  Negative for chills, fever, malaise/fatigue and weight loss.   HENT: Negative.  Negative for hearing loss.    Eyes: Negative.  Negative for blurred vision and double vision.   Respiratory: Negative.  Negative for cough and shortness of breath.    Cardiovascular: Negative.  Negative for chest pain, palpitations, claudication and leg swelling.   Gastrointestinal: Negative.  Negative for abdominal pain, nausea and vomiting.   Genitourinary: Negative.  Negative for dysuria and urgency.   Musculoskeletal: Negative.  Negative for joint pain and myalgias.   Skin: Negative.  Negative for itching and rash.   Neurological: Negative.  Negative for dizziness, focal weakness, weakness and headaches.   Endo/Heme/Allergies: Negative.  Does not bruise/bleed easily.   Psychiatric/Behavioral: Negative.  Negative for depression. The patient is not nervous/anxious.         Objective:   /78 (BP Location: Left arm, Patient Position: Sitting, BP Cuff Size: Adult)   Pulse (!) 58   Ht 1.829 m (6')   Wt 117.3 kg (258 lb 7.8 oz)   SpO2 96%   BMI 35.06 kg/m²     Physical Exam    Constitutional: He is oriented to person, place, and time. He appears well-developed and well-nourished.   HENT:   Head: Normocephalic and atraumatic.   Eyes: Pupils are equal, round, and reactive to light. Conjunctivae are normal.   Neck: Normal range of motion. Neck supple.   Cardiovascular: Normal rate and regular rhythm.    Pulmonary/Chest: Effort normal and breath sounds normal.   Abdominal: Soft. Bowel sounds are normal.   Musculoskeletal: Normal range of motion.   Neurological: He is alert and oriented to person, place, and time.   Skin: Skin is warm and dry.   Psychiatric: He has a normal mood and affect.     CARDIAC STUDIES/PROCEDURES:     CARDIAC CATHETERIZATION CONCLUSIONS by Dr. Morales (08/25/11)  A 56-year-old male with the above clinical and cardiovascular history who has angiographic evidence of normal left ventricular function, normal wall motion, patent mid LAD stent and additional noncritical coronary artery disease. The patient will be continued on aggressive medical therapy.     CARDIAC CATHETERIZATION CONCLUSIONS (05/17/10)  1. Three-vessel coronary artery disease with high-grade in-stent restenosis of the mid LAD, ostial high-grade D2 stenosis originating from the high-grade in-stent restenosis territory, nonobstructive obtuse marginal, and RCA stenosis.   2. Successful stent placement of the mid LAD with 3.0 x 15-mm [place] drug-eluting stent.   3. Successful PTCA of the ostial D2.     CARDIAC CATHETERIZATION CONCLUSIONS by Dr. Khan (05/06/09)  1. Normal left ventricular systolic and diastolic function.   2. High grade complex stenosis proximal left anterior descending coronary artery status post successful stenting of that vessel and successful stenting of a dissection distal to the initial stent with a second stent as described above. (3.0 X 15 mm [place] drug eluding stents x 2)  3. Moderate disease in two of the diagonal branches of the left anterior descending artery described  above.   4. Plaquing disease in the circumflex and right coronary arteries.     CAROTID ULTRASOUND (12/21/16)  Mild bilateral internal carotid artery stenosis (<50%).      CAROTID ULTRASOUND (07/15/09)  Mild plaques noted.     ECHOCARDIOGRAM CONCLUSIONS (12/21/16)  Normal transthoracic echocardiogram.   Compared to the images of the prior study done  8/5/2009, there has   been no significant change, there is no longer features of mild   diastolic dysfunction.     ECHOCARDIOGRAM (08/05/09)  Unremarkable echocardiogram with ejection fraction of 60%.     EKG performed on (08/25/11) EKG shows normal sinus rhythm     Laboratory results of (12/11/18) were reviewed. Cholesterol profile of 109/108/39/48 noted.  Laboratory results of (01/19/18) Cholesterol profile of 127/151/37/60 noted.  Laboratory results of (02/08/17) Cholesterol profile of 123/99/37/67 noted.  Laboratory results of (11/28/16) Cholesterol profile of 156/114/46/87 noted.     LOWER EXTREMITY ARTERIAL ULTRASOUND (07/15/09)  Unremarkable arterial ultrasound.     MPI CONCLUSIONS (12/21/16)  Normal stress test.   No evidence of significant jeopardized viable myocardium or prior myocardial infarction.  Normal left ventricular size, ejection fraction, and wall motion.  ECG INTERPRETATION  Negative stress ECG for ischemia.    Assessment:     1. Coronary artery disease involving native coronary artery of native heart without angina pectoris     2. Stented coronary artery     3. Essential hypertension, benign     4. Dyslipidemia     5. Atherosclerosis of both carotid arteries         Medical Decision Making:  Today's Assessment / Status / Plan:     1. Coronary artery disease with stent placement: He is clinically doing well.We will continue with medical therapy.  2. Hypertension: Blood pressure is well controlled.  3. Hyperlipidemia: He is doing well on statin therapy without myalgia symptoms. His LDL level is low. We will reduce pravastatin to 20 mg QD and repeat  labs.  4. Carotid artery stenosis: Stable on medical therapy.     We will follow up the patient in one yea with labs.     CC Miladys Lara

## 2019-03-04 DIAGNOSIS — I10 ESSENTIAL HYPERTENSION: ICD-10-CM

## 2019-09-27 NOTE — ADDENDUM NOTE
Addended by: ANAID JONES on: 2/24/2017 10:49 AM     Modules accepted: Orders     Localized Dermabrasion Text: The patient was draped in routine manner.  Localized dermabrasion using 3 x 17 mm wire brush was performed in routine manner to papillary dermis. This spot dermabrasion is being performed to complete skin cancer reconstruction. It also will eliminate the other sun damaged precancerous cells that are known to be part of the regional effect of a lifetime's worth of sun exposure. This localized dermabrasion is therapeutic and should not be considered cosmetic in any regard. Localized Dermabrasion With Wire Brush Text: The patient was draped in routine manner.  Localized dermabrasion using 3 x 17 mm wire brush was performed in routine manner to papillary dermis. This spot dermabrasion is being performed to complete skin cancer reconstruction. It also will eliminate the other sun damaged precancerous cells that are known to be part of the regional effect of a lifetime's worth of sun exposure. This localized dermabrasion is therapeutic and should not be considered cosmetic in any regard.

## 2019-09-30 ENCOUNTER — TELEPHONE (OUTPATIENT)
Dept: CARDIOLOGY | Facility: MEDICAL CENTER | Age: 64
End: 2019-09-30

## 2019-09-30 NOTE — TELEPHONE ENCOUNTER
Try to get of hold of patient , reminder to have labs done prior to his apt 01/17/2019 .could not leave message.

## 2019-10-17 ENCOUNTER — OFFICE VISIT (OUTPATIENT)
Dept: CARDIOLOGY | Facility: MEDICAL CENTER | Age: 64
End: 2019-10-17
Payer: MEDICARE

## 2019-10-17 VITALS
HEIGHT: 72 IN | SYSTOLIC BLOOD PRESSURE: 140 MMHG | HEART RATE: 52 BPM | OXYGEN SATURATION: 96 % | WEIGHT: 206 LBS | BODY MASS INDEX: 27.9 KG/M2 | DIASTOLIC BLOOD PRESSURE: 72 MMHG

## 2019-10-17 DIAGNOSIS — I25.10 CORONARY ARTERY DISEASE INVOLVING NATIVE CORONARY ARTERY OF NATIVE HEART WITHOUT ANGINA PECTORIS: ICD-10-CM

## 2019-10-17 DIAGNOSIS — Z95.5 STENTED CORONARY ARTERY: ICD-10-CM

## 2019-10-17 DIAGNOSIS — E78.5 DYSLIPIDEMIA: ICD-10-CM

## 2019-10-17 DIAGNOSIS — I10 ESSENTIAL HYPERTENSION, BENIGN: ICD-10-CM

## 2019-10-17 DIAGNOSIS — I10 ESSENTIAL HYPERTENSION: ICD-10-CM

## 2019-10-17 LAB — EKG IMPRESSION: NORMAL

## 2019-10-17 PROCEDURE — 93000 ELECTROCARDIOGRAM COMPLETE: CPT | Performed by: INTERNAL MEDICINE

## 2019-10-17 PROCEDURE — 99214 OFFICE O/P EST MOD 30 MIN: CPT | Performed by: NURSE PRACTITIONER

## 2019-10-17 RX ORDER — TAMSULOSIN HYDROCHLORIDE 0.4 MG/1
0.4 CAPSULE ORAL DAILY
COMMUNITY
Start: 2019-10-04 | End: 2022-09-06

## 2019-10-17 RX ORDER — METFORMIN HYDROCHLORIDE 500 MG/1
500 TABLET, EXTENDED RELEASE ORAL DAILY
COMMUNITY
Start: 2019-08-19 | End: 2019-11-18

## 2019-10-17 RX ORDER — RAMIPRIL 2.5 MG/1
2.5 CAPSULE ORAL DAILY
Qty: 30 CAP | Refills: 3 | Status: SHIPPED
Start: 2019-10-17 | End: 2020-01-30

## 2019-10-17 ASSESSMENT — ENCOUNTER SYMPTOMS
COUGH: 0
FALLS: 0
DOUBLE VISION: 0
SENSORY CHANGE: 0
WHEEZING: 0
DIZZINESS: 0
NERVOUS/ANXIOUS: 0
SHORTNESS OF BREATH: 0
PALPITATIONS: 0
FOCAL WEAKNESS: 0
WEAKNESS: 0
TINGLING: 1
BLURRED VISION: 0
HEADACHES: 0
ORTHOPNEA: 0
LOSS OF CONSCIOUSNESS: 0

## 2019-10-17 NOTE — LETTER
[facility]   [address]  [place] [address]  Phone: [phone]  Fax: [phone]              Socartes Falk  1955    Encounter Date: 10/17/2019    STACIE Araujo          PROGRESS NOTE:  Chief Complaint   Patient presents with   • Coronary Artery Disease     PP       Subjective:   Socrates Falk is a 64 y.o. male who presents today for follow up CAD.    He is followed by Dr. Rea in our clinic, history of cad S/P pci mid and proximal LAD, hypertension, hyperlipidemia and carotid artery stenosis.    Patient was sitting down and all of sudden felt numbness and tingling sensation in both arms. Then resolved within minutes. It catches his breath when it happened. He has chronic back pain and in which he is planning to get MRI tomorrow of his spine.      He has lost 50lbs on dandre craigs diet, his blood pressure was running low sbp 90s and PCP took him off bb and ace inhibitor. He noticed his blood pressure jumped being off and resumed his bb as soon as possible.     Past Medical History:   Diagnosis Date   • Angina    • Backpain    • CAD (coronary artery disease)    • Chest pain, unspecified    • Coronary atherosclerosis of native coronary artery    • Esophageal reflux    • Esophageal reflux    • Essential hypertension, benign    • Hypertension    • Nonspecific abnormal unspecified cardiovascular function study    • Occlusion and stenosis of carotid artery without mention of cerebral infarction    • Other and unspecified angina pectoris    • Other and unspecified hyperlipidemia    • Personal history of arthritis    • Unspecified essential hypertension      Past Surgical History:   Procedure Laterality Date   • OTHER CARDIAC SURGERY  05/2010    stent x 1   • OTHER CARDIAC SURGERY  05/2009    stent x 2   • HERNIA REPAIR     • OTHER     • OTHER ABDOMINAL SURGERY      hernia   • PB REMOVAL OF TONSILS,<13 Y/O     • TONSILLECTOMY       Family  History   Problem Relation Age of Onset   • Heart Disease Mother         cad   • Heart Disease Father         Aortic Aneurysm/cabg   • Heart Disease Brother         cabg     Social History     Socioeconomic History   • Marital status:      Spouse name: Not on file   • Number of children: Not on file   • Years of education: Not on file   • Highest education level: Not on file   Occupational History   • Not on file   Social Needs   • Financial resource strain: Not on file   • Food insecurity:     Worry: Not on file     Inability: Not on file   • Transportation needs:     Medical: Not on file     Non-medical: Not on file   Tobacco Use   • Smoking status: Former Smoker     Last attempt to quit: 2006     Years since quittin.8   • Smokeless tobacco: Former User     Types: Chew     Quit date: 1990   • Tobacco comment: 10/2008   Substance and Sexual Activity   • Alcohol use: No   • Drug use: No   • Sexual activity: Not on file   Lifestyle   • Physical activity:     Days per week: Not on file     Minutes per session: Not on file   • Stress: Not on file   Relationships   • Social connections:     Talks on phone: Not on file     Gets together: Not on file     Attends [employer]ism service: Not on file     Active member of club or organization: Not on file     Attends meetings of clubs or organizations: Not on file     Relationship status: Not on file   • Intimate partner violence:     Fear of current or ex partner: Not on file     Emotionally abused: Not on file     Physically abused: Not on file     Forced sexual activity: Not on file   Other Topics Concern   • Not on file   Social History Narrative   • Not on file     No Known Allergies  Outpatient Encounter Medications as of 10/17/2019   Medication Sig Dispense Refill   • ramipril (ALTACE) 2.5 MG Cap Take 1 Cap by mouth every day. 30 Cap 3   • metoprolol (LOPRESSOR) 25 MG Tab Take 0.5 Tabs by mouth 2 times a day. 180 Tab 3   • pravastatin (PRAVACHOL) 20 MG  Tab Take 1 Tab by mouth every day. 90 Tab 3   • psyllium (METAMUCIL) 58.12 % Pack Take 1 Packet by mouth every day.     • omeprazole (PRILOSEC) 20 MG CPDR TAKE ONE CAPSULE BY MOUTH EVERY DAY 90 Cap 3   • aspirin (ASA) 81 MG CHEW Take 81 mg by mouth every day.     • [DISCONTINUED] metoprolol (LOPRESSOR) 25 MG Tab Take 1 Tab by mouth 2 times a day. 180 Tab 3   • [DISCONTINUED] ramipril (ALTACE) 10 MG capsule Take 1 Cap by mouth every day. (Patient not taking: Reported on 10/17/2019) 90 Cap 3     No facility-administered encounter medications on file as of 10/17/2019.      Review of Systems   Constitutional: Negative for malaise/fatigue.   Eyes: Negative for blurred vision and double vision.   Respiratory: Negative for cough, shortness of breath and wheezing.    Cardiovascular: Negative for chest pain, palpitations, orthopnea and leg swelling.   Musculoskeletal: Negative for falls.   Neurological: Positive for tingling. Negative for dizziness, sensory change, focal weakness, loss of consciousness, weakness and headaches.   Psychiatric/Behavioral: The patient is not nervous/anxious.    All other systems reviewed and are negative.       Objective:   /72 (BP Location: Left arm, Patient Position: Sitting, BP Cuff Size: Adult)   Pulse (!) 52   Ht 1.829 m (6')   Wt 93.4 kg (206 lb)   SpO2 96%   BMI 27.94 kg/m²      Physical Exam   Constitutional: He is oriented to person, place, and time. He appears well-developed and well-nourished. No distress.   HENT:   Head: Normocephalic and atraumatic.   Eyes: Pupils are equal, round, and reactive to light.   Neck: No JVD present.   Cardiovascular: Normal rate, regular rhythm and normal heart sounds.   Pulmonary/Chest: Effort normal and breath sounds normal.   Abdominal: Soft. Bowel sounds are normal. He exhibits no distension.   Musculoskeletal: He exhibits no edema.   Neurological: He is alert and oriented to person, place, and time.   Skin: Skin is warm and dry. He is not  diaphoretic.   Psychiatric: He has a normal mood and affect. His behavior is normal. Judgment and thought content normal.       CARDIAC CATHETERIZATION CONCLUSIONS by Dr. Morales (08/25/11)  A 56-year-old male with the above clinical and cardiovascular history who has angiographic evidence of normal left ventricular function, normal wall motion, patent mid LAD stent and additional noncritical coronary artery disease. The patient will be continued on aggressive medical therapy.     CARDIAC CATHETERIZATION CONCLUSIONS (05/17/10)  1. Three-vessel coronary artery disease with high-grade in-stent restenosis of the mid LAD, ostial high-grade D2 stenosis originating from the high-grade in-stent restenosis territory, nonobstructive obtuse marginal, and RCA stenosis.   2. Successful stent placement of the mid LAD with 3.0 x 15-mm [place] drug-eluting stent.   3. Successful PTCA of the ostial D2.     CARDIAC CATHETERIZATION CONCLUSIONS by Dr. Khan (05/06/09)  1. Normal left ventricular systolic and diastolic function.   2. High grade complex stenosis proximal left anterior descending coronary artery status post successful stenting of that vessel and successful stenting of a dissection distal to the initial stent with a second stent as described above. (3.0 X 15 mm [place] drug eluding stents x 2)  3. Moderate disease in two of the diagonal branches of the left anterior descending artery described above.   4. Plaquing disease in the circumflex and right coronary arteries.     CAROTID ULTRASOUND (12/21/16)  Mild bilateral internal carotid artery stenosis (<50%).      CAROTID ULTRASOUND (07/15/09)  Mild plaques noted.     ECHOCARDIOGRAM CONCLUSIONS (12/21/16)  Normal transthoracic echocardiogram.   Compared to the images of the prior study done  8/5/2009, there has   been no significant change, there is no longer features of mild   diastolic dysfunction.     ECHOCARDIOGRAM (08/05/09)  Unremarkable echocardiogram with  ejection fraction of 60%.    Myocardial Perfusion  12/21/2016   Normal stress test.    No evidence of significant jeopardized viable myocardium or prior myocardial    infarction.   Normal left ventricular size, ejection fraction, and wall motion.   ECG INTERPRETATION   Negative stress ECG for ischemia.    Lab Results   Component Value Date/Time    CHOLSTRLTOT 109 12/11/2018 10:28 AM    CHOLSTRLTOT 88 (L) 08/24/2011 12:40 PM    LDL 48 12/11/2018 10:28 AM    LDL 43 08/24/2011 12:40 PM    HDL 39 (L) 12/11/2018 10:28 AM    HDL 33 (L) 08/24/2011 12:40 PM    TRIGLYCERIDE 108 12/11/2018 10:28 AM    TRIGLYCERIDE 61 08/24/2011 12:40 PM       Lab Results   Component Value Date/Time    SODIUM 143 12/11/2018 10:28 AM    SODIUM 138 08/25/2011 03:55 AM    POTASSIUM 5.2 12/11/2018 10:28 AM    POTASSIUM 4.7 08/25/2011 03:55 AM    CHLORIDE 104 12/11/2018 10:28 AM    CHLORIDE 109 08/25/2011 03:55 AM    CO2 24 12/11/2018 10:28 AM    CO2 24 08/25/2011 03:55 AM    GLUCOSE 94 12/11/2018 10:28 AM    GLUCOSE 105 (H) 08/25/2011 03:55 AM    BUN 23 12/11/2018 10:28 AM    BUN 16 08/25/2011 03:55 AM    CREATININE 1.15 12/11/2018 10:28 AM    CREATININE 1.09 08/25/2011 03:55 AM    CREATININE 1.2 05/08/2009 03:30 AM    BUNCREATRAT 20 12/11/2018 10:28 AM     Lab Results   Component Value Date/Time    ALKPHOSPHAT 45 12/11/2018 10:28 AM    ALKPHOSPHAT 40 08/25/2011 03:55 AM    ASTSGOT 36 12/11/2018 10:28 AM    ASTSGOT 23 08/25/2011 03:55 AM    ALTSGPT 52 (H) 12/11/2018 10:28 AM    ALTSGPT 38 08/25/2011 03:55 AM    TBILIRUBIN 0.7 12/11/2018 10:28 AM    TBILIRUBIN 1.1 08/25/2011 03:55 AM        Assessment:     1. Coronary artery disease involving native coronary artery of native heart without angina pectoris  EKG - Clinic Performed   2. Essential hypertension, benign     3. Dyslipidemia     4. Stented coronary artery  EKG - Clinic Performed   5. Essential hypertension  ramipril (ALTACE) 2.5 MG Cap    metoprolol (LOPRESSOR) 25 MG Tab       Medical  Decision Making:  Today's Assessment / Status / Plan:     1. Numbness and tingling sensation:  - most likely coming from his spine   - we will wait till we get MRI of his back    2. CAD;  - EKG showed sinus gayle 48  - denies any angina  - reduce metoprolol 12.5mg BID and restart ramipril to 2.5mg qd   - continue asa and pravastatin 20mg qd     Follow up in 1 month after MRI and ER precaution.    Collaborating Provider: Dr. Paloma Sheth, A.P.N.  [address] [place] [id number]  VIA Facsimile: [phone]

## 2019-10-17 NOTE — PROGRESS NOTES
Chief Complaint   Patient presents with   • Coronary Artery Disease     PP       Subjective:   Socrates Falk is a 64 y.o. male who presents today for follow up CAD.    He is followed by Dr. Rea in our clinic, history of cad S/P pci mid and proximal LAD, hypertension, hyperlipidemia and carotid artery stenosis.    Patient was sitting down and all of sudden felt numbness and tingling sensation in both arms. Then resolved within minutes. It catches his breath when it happened. He has chronic back pain and in which he is planning to get MRI tomorrow of his spine.      He has lost 50lbs on [employer] diet, his blood pressure was running low sbp 90s and PCP took him off bb and ace inhibitor. He noticed his blood pressure jumped being off and resumed his bb as soon as possible.     Past Medical History:   Diagnosis Date   • Angina    • Backpain    • CAD (coronary artery disease)    • Chest pain, unspecified    • Coronary atherosclerosis of native coronary artery    • Esophageal reflux    • Esophageal reflux    • Essential hypertension, benign    • Hypertension    • Nonspecific abnormal unspecified cardiovascular function study    • Occlusion and stenosis of carotid artery without mention of cerebral infarction    • Other and unspecified angina pectoris    • Other and unspecified hyperlipidemia    • Personal history of arthritis    • Unspecified essential hypertension      Past Surgical History:   Procedure Laterality Date   • OTHER CARDIAC SURGERY  05/2010    stent x 1   • OTHER CARDIAC SURGERY  05/2009    stent x 2   • HERNIA REPAIR     • OTHER     • OTHER ABDOMINAL SURGERY      hernia   • PB REMOVAL OF TONSILS,<12 Y/O     • TONSILLECTOMY       Family History   Problem Relation Age of Onset   • Heart Disease Mother         cad   • Heart Disease Father         Aortic Aneurysm/cabg   • Heart Disease Brother         cabg     Social History     Socioeconomic History   • Marital status:      Spouse name: Not on  file   • Number of children: Not on file   • Years of education: Not on file   • Highest education level: Not on file   Occupational History   • Not on file   Social Needs   • Financial resource strain: Not on file   • Food insecurity:     Worry: Not on file     Inability: Not on file   • Transportation needs:     Medical: Not on file     Non-medical: Not on file   Tobacco Use   • Smoking status: Former Smoker     Last attempt to quit: 2006     Years since quittin.8   • Smokeless tobacco: Former User     Types: Chew     Quit date: 1990   • Tobacco comment: 10/2008   Substance and Sexual Activity   • Alcohol use: No   • Drug use: No   • Sexual activity: Not on file   Lifestyle   • Physical activity:     Days per week: Not on file     Minutes per session: Not on file   • Stress: Not on file   Relationships   • Social connections:     Talks on phone: Not on file     Gets together: Not on file     Attends Mandaeism service: Not on file     Active member of club or organization: Not on file     Attends meetings of clubs or organizations: Not on file     Relationship status: Not on file   • Intimate partner violence:     Fear of current or ex partner: Not on file     Emotionally abused: Not on file     Physically abused: Not on file     Forced sexual activity: Not on file   Other Topics Concern   • Not on file   Social History Narrative   • Not on file     No Known Allergies  Outpatient Encounter Medications as of 10/17/2019   Medication Sig Dispense Refill   • ramipril (ALTACE) 2.5 MG Cap Take 1 Cap by mouth every day. 30 Cap 3   • metoprolol (LOPRESSOR) 25 MG Tab Take 0.5 Tabs by mouth 2 times a day. 180 Tab 3   • pravastatin (PRAVACHOL) 20 MG Tab Take 1 Tab by mouth every day. 90 Tab 3   • psyllium (METAMUCIL) 58.12 % Pack Take 1 Packet by mouth every day.     • omeprazole (PRILOSEC) 20 MG CPDR TAKE ONE CAPSULE BY MOUTH EVERY DAY 90 Cap 3   • aspirin (ASA) 81 MG CHEW Take 81 mg by mouth every day.     •  [DISCONTINUED] metoprolol (LOPRESSOR) 25 MG Tab Take 1 Tab by mouth 2 times a day. 180 Tab 3   • [DISCONTINUED] ramipril (ALTACE) 10 MG capsule Take 1 Cap by mouth every day. (Patient not taking: Reported on 10/17/2019) 90 Cap 3     No facility-administered encounter medications on file as of 10/17/2019.      Review of Systems   Constitutional: Negative for malaise/fatigue.   Eyes: Negative for blurred vision and double vision.   Respiratory: Negative for cough, shortness of breath and wheezing.    Cardiovascular: Negative for chest pain, palpitations, orthopnea and leg swelling.   Musculoskeletal: Negative for falls.   Neurological: Positive for tingling. Negative for dizziness, sensory change, focal weakness, loss of consciousness, weakness and headaches.   Psychiatric/Behavioral: The patient is not nervous/anxious.    All other systems reviewed and are negative.       Objective:   /72 (BP Location: Left arm, Patient Position: Sitting, BP Cuff Size: Adult)   Pulse (!) 52   Ht 1.829 m (6')   Wt 93.4 kg (206 lb)   SpO2 96%   BMI 27.94 kg/m²     Physical Exam   Constitutional: He is oriented to person, place, and time. He appears well-developed and well-nourished. No distress.   HENT:   Head: Normocephalic and atraumatic.   Eyes: Pupils are equal, round, and reactive to light.   Neck: No JVD present.   Cardiovascular: Normal rate, regular rhythm and normal heart sounds.   Pulmonary/Chest: Effort normal and breath sounds normal.   Abdominal: Soft. Bowel sounds are normal. He exhibits no distension.   Musculoskeletal: He exhibits no edema.   Neurological: He is alert and oriented to person, place, and time.   Skin: Skin is warm and dry. He is not diaphoretic.   Psychiatric: He has a normal mood and affect. His behavior is normal. Judgment and thought content normal.       CARDIAC CATHETERIZATION CONCLUSIONS by Dr. Morales (08/25/11)  A 56-year-old male with the above clinical and cardiovascular history  who has angiographic evidence of normal left ventricular function, normal wall motion, patent mid LAD stent and additional noncritical coronary artery disease. The patient will be continued on aggressive medical therapy.     CARDIAC CATHETERIZATION CONCLUSIONS (05/17/10)  1. Three-vessel coronary artery disease with high-grade in-stent restenosis of the mid LAD, ostial high-grade D2 stenosis originating from the high-grade in-stent restenosis territory, nonobstructive obtuse marginal, and RCA stenosis.   2. Successful stent placement of the mid LAD with 3.0 x 15-mm [place] drug-eluting stent.   3. Successful PTCA of the ostial D2.     CARDIAC CATHETERIZATION CONCLUSIONS by Dr. Khan (05/06/09)  1. Normal left ventricular systolic and diastolic function.   2. High grade complex stenosis proximal left anterior descending coronary artery status post successful stenting of that vessel and successful stenting of a dissection distal to the initial stent with a second stent as described above. (3.0 X 15 mm [place] drug eluding stents x 2)  3. Moderate disease in two of the diagonal branches of the left anterior descending artery described above.   4. Plaquing disease in the circumflex and right coronary arteries.     CAROTID ULTRASOUND (12/21/16)  Mild bilateral internal carotid artery stenosis (<50%).      CAROTID ULTRASOUND (07/15/09)  Mild plaques noted.     ECHOCARDIOGRAM CONCLUSIONS (12/21/16)  Normal transthoracic echocardiogram.   Compared to the images of the prior study done  8/5/2009, there has   been no significant change, there is no longer features of mild   diastolic dysfunction.     ECHOCARDIOGRAM (08/05/09)  Unremarkable echocardiogram with ejection fraction of 60%.    Myocardial Perfusion  12/21/2016   Normal stress test.    No evidence of significant jeopardized viable myocardium or prior myocardial    infarction.   Normal left ventricular size, ejection fraction, and wall motion.   ECG  INTERPRETATION   Negative stress ECG for ischemia.    Lab Results   Component Value Date/Time    CHOLSTRLTOT 109 12/11/2018 10:28 AM    CHOLSTRLTOT 88 (L) 08/24/2011 12:40 PM    LDL 48 12/11/2018 10:28 AM    LDL 43 08/24/2011 12:40 PM    HDL 39 (L) 12/11/2018 10:28 AM    HDL 33 (L) 08/24/2011 12:40 PM    TRIGLYCERIDE 108 12/11/2018 10:28 AM    TRIGLYCERIDE 61 08/24/2011 12:40 PM       Lab Results   Component Value Date/Time    SODIUM 143 12/11/2018 10:28 AM    SODIUM 138 08/25/2011 03:55 AM    POTASSIUM 5.2 12/11/2018 10:28 AM    POTASSIUM 4.7 08/25/2011 03:55 AM    CHLORIDE 104 12/11/2018 10:28 AM    CHLORIDE 109 08/25/2011 03:55 AM    CO2 24 12/11/2018 10:28 AM    CO2 24 08/25/2011 03:55 AM    GLUCOSE 94 12/11/2018 10:28 AM    GLUCOSE 105 (H) 08/25/2011 03:55 AM    BUN 23 12/11/2018 10:28 AM    BUN 16 08/25/2011 03:55 AM    CREATININE 1.15 12/11/2018 10:28 AM    CREATININE 1.09 08/25/2011 03:55 AM    CREATININE 1.2 05/08/2009 03:30 AM    BUNCREATRAT 20 12/11/2018 10:28 AM     Lab Results   Component Value Date/Time    ALKPHOSPHAT 45 12/11/2018 10:28 AM    ALKPHOSPHAT 40 08/25/2011 03:55 AM    ASTSGOT 36 12/11/2018 10:28 AM    ASTSGOT 23 08/25/2011 03:55 AM    ALTSGPT 52 (H) 12/11/2018 10:28 AM    ALTSGPT 38 08/25/2011 03:55 AM    TBILIRUBIN 0.7 12/11/2018 10:28 AM    TBILIRUBIN 1.1 08/25/2011 03:55 AM        Assessment:     1. Coronary artery disease involving native coronary artery of native heart without angina pectoris  EKG - Clinic Performed   2. Essential hypertension, benign     3. Dyslipidemia     4. Stented coronary artery  EKG - Clinic Performed   5. Essential hypertension  ramipril (ALTACE) 2.5 MG Cap    metoprolol (LOPRESSOR) 25 MG Tab       Medical Decision Making:  Today's Assessment / Status / Plan:     1. Numbness and tingling sensation:  - most likely coming from his spine   - we will wait till we get MRI of his back    2. CAD;  - EKG showed sinus gayle 48  - denies any angina  - reduce metoprolol  12.5mg BID and restart ramipril to 2.5mg qd   - continue asa and pravastatin 20mg qd     Follow up in 1 month after MRI and ER precaution.    Collaborating Provider: Dr. Dow

## 2019-11-18 ENCOUNTER — TELEPHONE (OUTPATIENT)
Dept: CARDIOLOGY | Facility: MEDICAL CENTER | Age: 64
End: 2019-11-18

## 2019-11-18 ENCOUNTER — OFFICE VISIT (OUTPATIENT)
Dept: CARDIOLOGY | Facility: MEDICAL CENTER | Age: 64
End: 2019-11-18
Payer: MEDICARE

## 2019-11-18 VITALS
SYSTOLIC BLOOD PRESSURE: 120 MMHG | DIASTOLIC BLOOD PRESSURE: 64 MMHG | HEIGHT: 72 IN | WEIGHT: 208.8 LBS | BODY MASS INDEX: 28.28 KG/M2 | OXYGEN SATURATION: 95 % | HEART RATE: 64 BPM

## 2019-11-18 DIAGNOSIS — I65.23 ATHEROSCLEROSIS OF BOTH CAROTID ARTERIES: ICD-10-CM

## 2019-11-18 DIAGNOSIS — Z95.5 STENTED CORONARY ARTERY: ICD-10-CM

## 2019-11-18 DIAGNOSIS — I25.10 CORONARY ARTERY DISEASE INVOLVING NATIVE CORONARY ARTERY OF NATIVE HEART WITHOUT ANGINA PECTORIS: ICD-10-CM

## 2019-11-18 PROCEDURE — 99213 OFFICE O/P EST LOW 20 MIN: CPT | Performed by: NURSE PRACTITIONER

## 2019-11-18 ASSESSMENT — ENCOUNTER SYMPTOMS
SENSORY CHANGE: 0
DOUBLE VISION: 0
WHEEZING: 0
FALLS: 0
FOCAL WEAKNESS: 0
SHORTNESS OF BREATH: 0
BLURRED VISION: 0
HEADACHES: 0
WEAKNESS: 0
ORTHOPNEA: 0
COUGH: 0
PALPITATIONS: 0
DIZZINESS: 0
NERVOUS/ANXIOUS: 0
LOSS OF CONSCIOUSNESS: 0
TINGLING: 0

## 2019-11-18 NOTE — TELEPHONE ENCOUNTER
RT    Janet @ [facility] wants to know if it's ok for pt to take Contrave to lose weight. #[phone]

## 2019-11-18 NOTE — PROGRESS NOTES
Chief Complaint   Patient presents with   • Coronary Artery Disease       Subjective:   Socrates Falk is a 64 y.o. male who presents today for follow up CAD.    He is followed by Dr. Rea in our clinic, history of cad S/P pci mid and proximal LAD, hypertension, hyperlipidemia and carotid artery stenosis.    Since reducing his metoprolol and starting his ramipril again. The numbness and tingling sensation resolved. This weekend patient stated he was helping his son do some yard work without any difficulties.      He has lost 50lbs on [employer] diet.      Past Medical History:   Diagnosis Date   • Angina    • Backpain    • CAD (coronary artery disease)    • Chest pain, unspecified    • Coronary atherosclerosis of native coronary artery    • Esophageal reflux    • Esophageal reflux    • Essential hypertension, benign    • Hypertension    • Nonspecific abnormal unspecified cardiovascular function study    • Occlusion and stenosis of carotid artery without mention of cerebral infarction    • Other and unspecified angina pectoris    • Other and unspecified hyperlipidemia    • Personal history of arthritis    • Unspecified essential hypertension      Past Surgical History:   Procedure Laterality Date   • OTHER CARDIAC SURGERY  05/2010    stent x 1   • OTHER CARDIAC SURGERY  05/2009    stent x 2   • HERNIA REPAIR     • OTHER     • OTHER ABDOMINAL SURGERY      hernia   • PB REMOVAL OF TONSILS,<10 Y/O     • TONSILLECTOMY       Family History   Problem Relation Age of Onset   • Heart Disease Mother         cad   • Heart Disease Father         Aortic Aneurysm/cabg   • Heart Disease Brother         cabg     Social History     Socioeconomic History   • Marital status:      Spouse name: Not on file   • Number of children: Not on file   • Years of education: Not on file   • Highest education level: Not on file   Occupational History   • Not on file   Social Needs   • Financial resource strain: Not on file   • Food  insecurity:     Worry: Not on file     Inability: Not on file   • Transportation needs:     Medical: Not on file     Non-medical: Not on file   Tobacco Use   • Smoking status: Former Smoker     Last attempt to quit: 2006     Years since quittin.8   • Smokeless tobacco: Former User     Types: Chew     Quit date: 1990   • Tobacco comment: 10/2008   Substance and Sexual Activity   • Alcohol use: No   • Drug use: No   • Sexual activity: Not on file   Lifestyle   • Physical activity:     Days per week: Not on file     Minutes per session: Not on file   • Stress: Not on file   Relationships   • Social connections:     Talks on phone: Not on file     Gets together: Not on file     Attends Ep[employer] service: Not on file     Active member of club or organization: Not on file     Attends meetings of clubs or organizations: Not on file     Relationship status: Not on file   • Intimate partner violence:     Fear of current or ex partner: Not on file     Emotionally abused: Not on file     Physically abused: Not on file     Forced sexual activity: Not on file   Other Topics Concern   • Not on file   Social History Narrative   • Not on file     No Known Allergies  Outpatient Encounter Medications as of 2019   Medication Sig Dispense Refill   • ramipril (ALTACE) 2.5 MG Cap Take 1 Cap by mouth every day. 30 Cap 3   • metoprolol (LOPRESSOR) 25 MG Tab Take 0.5 Tabs by mouth 2 times a day. 180 Tab 3   • tamsulosin (FLOMAX) 0.4 MG capsule Take 0.4 mg by mouth every day.     • pravastatin (PRAVACHOL) 20 MG Tab Take 1 Tab by mouth every day. 90 Tab 3   • psyllium (METAMUCIL) 58.12 % Pack Take 1 Packet by mouth every day.     • omeprazole (PRILOSEC) 20 MG CPDR TAKE ONE CAPSULE BY MOUTH EVERY DAY 90 Cap 3   • aspirin (ASA) 81 MG CHEW Take 81 mg by mouth every day.     • metFORMIN ER (GLUCOPHAGE XR) 500 MG TABLET SR 24 HR Take 500 mg by mouth every day.       No facility-administered encounter medications on file as of  11/18/2019.      Review of Systems   Constitutional: Negative for malaise/fatigue.   Eyes: Negative for blurred vision and double vision.   Respiratory: Negative for cough, shortness of breath and wheezing.    Cardiovascular: Negative for chest pain, palpitations, orthopnea and leg swelling.   Musculoskeletal: Negative for falls.   Neurological: Negative for dizziness, tingling, sensory change, focal weakness, loss of consciousness, weakness and headaches.   Psychiatric/Behavioral: The patient is not nervous/anxious.    All other systems reviewed and are negative.       Objective:   /64 (BP Location: Left arm, Patient Position: Sitting, BP Cuff Size: Adult)   Pulse 64   Ht 1.829 m (6')   Wt 94.7 kg (208 lb 12.8 oz)   SpO2 95%   BMI 28.32 kg/m²     Physical Exam   Constitutional: He is oriented to person, place, and time. He appears well-developed and well-nourished. No distress.   HENT:   Head: Normocephalic and atraumatic.   Eyes: Pupils are equal, round, and reactive to light.   Neck: No JVD present.   Cardiovascular: Normal rate, regular rhythm and normal heart sounds.   No murmur heard.  Pulmonary/Chest: Effort normal and breath sounds normal.   Abdominal: Soft. Bowel sounds are normal. He exhibits no distension.   Musculoskeletal:         General: No edema.   Neurological: He is alert and oriented to person, place, and time.   Skin: Skin is warm and dry. He is not diaphoretic.   Psychiatric: He has a normal mood and affect. His behavior is normal. Judgment and thought content normal.       CARDIAC CATHETERIZATION CONCLUSIONS by Dr. Morales (08/25/11)  A 56-year-old male with the above clinical and cardiovascular history who has angiographic evidence of normal left ventricular function, normal wall motion, patent mid LAD stent and additional noncritical coronary artery disease. The patient will be continued on aggressive medical therapy.     CARDIAC CATHETERIZATION CONCLUSIONS (05/17/10)  1.  Three-vessel coronary artery disease with high-grade in-stent restenosis of the mid LAD, ostial high-grade D2 stenosis originating from the high-grade in-stent restenosis territory, nonobstructive obtuse marginal, and RCA stenosis.   2. Successful stent placement of the mid LAD with 3.0 x 15-mm [place] drug-eluting stent.   3. Successful PTCA of the ostial D2.     CARDIAC CATHETERIZATION CONCLUSIONS by Dr. Khan (05/06/09)  1. Normal left ventricular systolic and diastolic function.   2. High grade complex stenosis proximal left anterior descending coronary artery status post successful stenting of that vessel and successful stenting of a dissection distal to the initial stent with a second stent as described above. (3.0 X 15 mm [place]ience drug eluding stents x 2)  3. Moderate disease in two of the diagonal branches of the left anterior descending artery described above.   4. Plaquing disease in the circumflex and right coronary arteries.     CAROTID ULTRASOUND (12/21/16)  Mild bilateral internal carotid artery stenosis (<50%).      CAROTID ULTRASOUND (07/15/09)  Mild plaques noted.     ECHOCARDIOGRAM CONCLUSIONS (12/21/16)  Normal transthoracic echocardiogram.   Compared to the images of the prior study done  8/5/2009, there has   been no significant change, there is no longer features of mild   diastolic dysfunction.     ECHOCARDIOGRAM (08/05/09)  Unremarkable echocardiogram with ejection fraction of 60%.    Myocardial Perfusion  12/21/2016   Normal stress test.    No evidence of significant jeopardized viable myocardium or prior myocardial    infarction.   Normal left ventricular size, ejection fraction, and wall motion.   ECG INTERPRETATION   Negative stress ECG for ischemia.    Lab Results   Component Value Date/Time    CHOLSTRLTOT 109 12/11/2018 10:28 AM    CHOLSTRLTOT 88 (L) 08/24/2011 12:40 PM    LDL 48 12/11/2018 10:28 AM    LDL 43 08/24/2011 12:40 PM    HDL 39 (L) 12/11/2018 10:28 AM    HDL 33 (L) 08/24/2011  12:40 PM    TRIGLYCERIDE 108 12/11/2018 10:28 AM    TRIGLYCERIDE 61 08/24/2011 12:40 PM       Lab Results   Component Value Date/Time    SODIUM 143 12/11/2018 10:28 AM    SODIUM 138 08/25/2011 03:55 AM    POTASSIUM 5.2 12/11/2018 10:28 AM    POTASSIUM 4.7 08/25/2011 03:55 AM    CHLORIDE 104 12/11/2018 10:28 AM    CHLORIDE 109 08/25/2011 03:55 AM    CO2 24 12/11/2018 10:28 AM    CO2 24 08/25/2011 03:55 AM    GLUCOSE 94 12/11/2018 10:28 AM    GLUCOSE 105 (H) 08/25/2011 03:55 AM    BUN 23 12/11/2018 10:28 AM    BUN 16 08/25/2011 03:55 AM    CREATININE 1.15 12/11/2018 10:28 AM    CREATININE 1.09 08/25/2011 03:55 AM    CREATININE 1.2 05/08/2009 03:30 AM    BUNCREATRAT 20 12/11/2018 10:28 AM     Lab Results   Component Value Date/Time    ALKPHOSPHAT 45 12/11/2018 10:28 AM    ALKPHOSPHAT 40 08/25/2011 03:55 AM    ASTSGOT 36 12/11/2018 10:28 AM    ASTSGOT 23 08/25/2011 03:55 AM    ALTSGPT 52 (H) 12/11/2018 10:28 AM    ALTSGPT 38 08/25/2011 03:55 AM    TBILIRUBIN 0.7 12/11/2018 10:28 AM    TBILIRUBIN 1.1 08/25/2011 03:55 AM        Assessment:     1. Coronary artery disease involving native coronary artery of native heart without angina pectoris     2. Stented coronary artery     3. Atherosclerosis of both carotid arteries         Medical Decision Making:  Today's Assessment / Status / Plan:     1.  CAD:  - denies any angina  - continue metoprolol 12.5mg BID and ramipril to 2.5mg qd   - continue asa and pravastatin 20mg qd     2. Numbness and tingling sensation:  - resolved, if another episodes occur we will obtain MPI  - cervical MRI normal per patient       Follow up in 3 months with Dr. Rea, sooner as needed.     Collaborating Provider: Dr. Jones

## 2019-11-19 NOTE — TELEPHONE ENCOUNTER
I recommend another medication. Patient has known CAD and hypertension, especially he had unexplained numbness and tingling in the arms. It would be moderate risk medication.

## 2019-12-14 LAB
ALBUMIN SERPL-MCNC: 4.7 G/DL (ref 3.6–4.8)
ALBUMIN/GLOB SERPL: 2 {RATIO} (ref 1.2–2.2)
ALP SERPL-CCNC: 50 IU/L (ref 39–117)
ALT SERPL-CCNC: 19 IU/L (ref 0–44)
AST SERPL-CCNC: 16 IU/L (ref 0–40)
BILIRUB SERPL-MCNC: 0.9 MG/DL (ref 0–1.2)
BUN SERPL-MCNC: 20 MG/DL (ref 8–27)
BUN/CREAT SERPL: 17 (ref 10–24)
CALCIUM SERPL-MCNC: 9.7 MG/DL (ref 8.6–10.2)
CHLORIDE SERPL-SCNC: 102 MMOL/L (ref 96–106)
CHOLEST SERPL-MCNC: 133 MG/DL (ref 100–199)
CO2 SERPL-SCNC: 23 MMOL/L (ref 20–29)
CREAT SERPL-MCNC: 1.21 MG/DL (ref 0.76–1.27)
GLOBULIN SER CALC-MCNC: 2.3 G/DL (ref 1.5–4.5)
GLUCOSE SERPL-MCNC: 111 MG/DL (ref 65–99)
HDLC SERPL-MCNC: 50 MG/DL
LABORATORY COMMENT REPORT: NORMAL
LDLC SERPL CALC-MCNC: 64 MG/DL (ref 0–99)
POTASSIUM SERPL-SCNC: 4.9 MMOL/L (ref 3.5–5.2)
PROT SERPL-MCNC: 7 G/DL (ref 6–8.5)
SODIUM SERPL-SCNC: 139 MMOL/L (ref 134–144)
TRIGL SERPL-MCNC: 97 MG/DL (ref 0–149)
VLDLC SERPL CALC-MCNC: 19 MG/DL (ref 5–40)

## 2019-12-26 DIAGNOSIS — E78.5 DYSLIPIDEMIA: ICD-10-CM

## 2019-12-27 RX ORDER — PRAVASTATIN SODIUM 20 MG
TABLET ORAL
Qty: 90 TAB | Refills: 3 | Status: SHIPPED | OUTPATIENT
Start: 2019-12-27 | End: 2020-10-27

## 2020-01-30 ENCOUNTER — OFFICE VISIT (OUTPATIENT)
Dept: CARDIOLOGY | Facility: MEDICAL CENTER | Age: 65
End: 2020-01-30
Payer: MEDICARE

## 2020-01-30 VITALS
DIASTOLIC BLOOD PRESSURE: 88 MMHG | BODY MASS INDEX: 30.11 KG/M2 | OXYGEN SATURATION: 94 % | HEIGHT: 72 IN | RESPIRATION RATE: 12 BRPM | WEIGHT: 222.33 LBS | HEART RATE: 60 BPM | SYSTOLIC BLOOD PRESSURE: 140 MMHG

## 2020-01-30 DIAGNOSIS — Z95.5 STENTED CORONARY ARTERY: ICD-10-CM

## 2020-01-30 DIAGNOSIS — E78.5 DYSLIPIDEMIA: ICD-10-CM

## 2020-01-30 DIAGNOSIS — I10 ESSENTIAL HYPERTENSION, BENIGN: ICD-10-CM

## 2020-01-30 DIAGNOSIS — I65.23 ATHEROSCLEROSIS OF BOTH CAROTID ARTERIES: ICD-10-CM

## 2020-01-30 DIAGNOSIS — I25.10 CORONARY ARTERY DISEASE INVOLVING NATIVE CORONARY ARTERY OF NATIVE HEART WITHOUT ANGINA PECTORIS: ICD-10-CM

## 2020-01-30 DIAGNOSIS — I10 ESSENTIAL HYPERTENSION: ICD-10-CM

## 2020-01-30 PROCEDURE — 99204 OFFICE O/P NEW MOD 45 MIN: CPT | Performed by: INTERNAL MEDICINE

## 2020-01-30 RX ORDER — TRAMADOL HYDROCHLORIDE 50 MG/1
TABLET ORAL
COMMUNITY
Start: 2019-12-23

## 2020-01-30 RX ORDER — COVID-19 ANTIGEN TEST
KIT MISCELLANEOUS 2 TIMES DAILY
COMMUNITY

## 2020-01-30 RX ORDER — RAMIPRIL 2.5 MG/1
5 CAPSULE ORAL DAILY
Qty: 90 CAP | Refills: 3 | Status: SHIPPED | OUTPATIENT
Start: 2020-01-30 | End: 2020-08-03

## 2020-01-30 RX ORDER — CHOLECALCIFEROL (VITAMIN D3) 125 MCG
500 CAPSULE ORAL DAILY
COMMUNITY

## 2020-01-30 RX ORDER — CELECOXIB 200 MG/1
CAPSULE ORAL
COMMUNITY
Start: 2019-12-20

## 2020-01-30 RX ORDER — METHOCARBAMOL 750 MG/1
TABLET, FILM COATED ORAL
COMMUNITY
Start: 2019-12-14

## 2020-01-30 ASSESSMENT — ENCOUNTER SYMPTOMS
HEADACHES: 0
BRUISES/BLEEDS EASILY: 0
DOUBLE VISION: 0
NAUSEA: 0
EYES NEGATIVE: 1
NEUROLOGICAL NEGATIVE: 1
NERVOUS/ANXIOUS: 0
COUGH: 0
FOCAL WEAKNESS: 0
VOMITING: 0
WEIGHT LOSS: 1
RESPIRATORY NEGATIVE: 1
CLAUDICATION: 0
PALPITATIONS: 0
DIZZINESS: 0
CARDIOVASCULAR NEGATIVE: 1
FEVER: 0
PSYCHIATRIC NEGATIVE: 1
CHILLS: 0
MUSCULOSKELETAL NEGATIVE: 1
BLURRED VISION: 0
ABDOMINAL PAIN: 0
WEAKNESS: 0
DEPRESSION: 0
MYALGIAS: 0
SHORTNESS OF BREATH: 0
GASTROINTESTINAL NEGATIVE: 1

## 2020-01-30 NOTE — PROGRESS NOTES
Chief Complaint   Patient presents with   • Coronary Artery Disease     follow up       Subjective:   Socrates Falk is a 64 y.o. male who presents today for annual follow up of coronary artery bypass graft surgery.    Since the patient's last visit on 01/04/19, she has been doing well clinically. She denies chest pain, shortness of breath, palpitations, nausea/vomiting or diaphoresis. He lost 60 lbs since last year with change of diet and increase in his steps. He is wanting to ski again. He has been off of Contrave for weight reduction.    Past Medical History:   Diagnosis Date   • Angina    • Backpain    • CAD (coronary artery disease)    • Chest pain, unspecified    • Coronary atherosclerosis of native coronary artery    • Esophageal reflux    • Esophageal reflux    • Essential hypertension, benign    • Hypertension    • Nonspecific abnormal unspecified cardiovascular function study    • Occlusion and stenosis of carotid artery without mention of cerebral infarction    • Other and unspecified angina pectoris    • Other and unspecified hyperlipidemia    • Personal history of arthritis    • Unspecified essential hypertension      Past Surgical History:   Procedure Laterality Date   • OTHER CARDIAC SURGERY  05/2010    stent x 1   • OTHER CARDIAC SURGERY  05/2009    stent x 2   • HERNIA REPAIR     • OTHER     • OTHER ABDOMINAL SURGERY      hernia   • PB REMOVAL OF TONSILS,<12 Y/O     • TONSILLECTOMY       Family History   Problem Relation Age of Onset   • Heart Disease Mother         cad   • Heart Disease Father         Aortic Aneurysm/cabg   • Heart Disease Brother         cabg     Social History     Socioeconomic History   • Marital status:      Spouse name: Not on file   • Number of children: Not on file   • Years of education: Not on file   • Highest education level: Not on file   Occupational History   • Not on file   Social Needs   • Financial resource strain: Not on file   • Food insecurity:      Worry: Not on file     Inability: Not on file   • Transportation needs:     Medical: Not on file     Non-medical: Not on file   Tobacco Use   • Smoking status: Former Smoker     Last attempt to quit: 2006     Years since quittin.0   • Smokeless tobacco: Former User     Types: Chew     Quit date: 1990   • Tobacco comment: 10/2008   Substance and Sexual Activity   • Alcohol use: No   • Drug use: No   • Sexual activity: Not on file   Lifestyle   • Physical activity:     Days per week: Not on file     Minutes per session: Not on file   • Stress: Not on file   Relationships   • Social connections:     Talks on phone: Not on file     Gets together: Not on file     Attends Scientologist service: Not on file     Active member of club or organization: Not on file     Attends meetings of clubs or organizations: Not on file     Relationship status: Not on file   • Intimate partner violence:     Fear of current or ex partner: Not on file     Emotionally abused: Not on file     Physically abused: Not on file     Forced sexual activity: Not on file   Other Topics Concern   • Not on file   Social History Narrative   • Not on file     No Known Allergies     (Medications reviewed.)  Outpatient Encounter Medications as of 2020   Medication Sig Dispense Refill   • tramadol (ULTRAM) 50 MG Tab      • celecoxib (CELEBREX) 200 MG Cap      • methocarbamol (ROBAXIN) 750 MG Tab      • cyanocobalamin (VITAMIN B-12) 500 MCG Tab Take 500 mcg by mouth every day.     • Naproxen Sodium 220 MG Cap Take  by mouth.     • pravastatin (PRAVACHOL) 20 MG Tab TAKE ONE TABLET BY MOUTH DAILY 90 Tab 3   • ramipril (ALTACE) 2.5 MG Cap Take 1 Cap by mouth every day. 30 Cap 3   • metoprolol (LOPRESSOR) 25 MG Tab Take 0.5 Tabs by mouth 2 times a day. 180 Tab 3   • tamsulosin (FLOMAX) 0.4 MG capsule Take 0.4 mg by mouth every day.     • psyllium (METAMUCIL) 58.12 % Pack Take 1 Packet by mouth every day.     • omeprazole (PRILOSEC) 20 MG CPDR TAKE  ONE CAPSULE BY MOUTH EVERY DAY 90 Cap 3   • aspirin (ASA) 81 MG CHEW Take 81 mg by mouth every day.       No facility-administered encounter medications on file as of 1/30/2020.      Review of Systems   Constitutional: Positive for weight loss. Negative for chills, fever and malaise/fatigue.   HENT: Negative.  Negative for hearing loss.    Eyes: Negative.  Negative for blurred vision and double vision.   Respiratory: Negative.  Negative for cough and shortness of breath.    Cardiovascular: Negative.  Negative for chest pain, palpitations, claudication and leg swelling.   Gastrointestinal: Negative.  Negative for abdominal pain, nausea and vomiting.   Genitourinary: Negative.  Negative for dysuria and urgency.   Musculoskeletal: Negative.  Negative for joint pain and myalgias.   Skin: Negative.  Negative for itching and rash.   Neurological: Negative.  Negative for dizziness, focal weakness, weakness and headaches.   Endo/Heme/Allergies: Negative.  Does not bruise/bleed easily.   Psychiatric/Behavioral: Negative.  Negative for depression. The patient is not nervous/anxious.         Objective:   /88 (BP Location: Left arm, Patient Position: Sitting, BP Cuff Size: Adult)   Pulse 60   Resp 12   Ht 1.829 m (6')   Wt 100.8 kg (222 lb 5.3 oz)   SpO2 94%   BMI 30.15 kg/m²     Physical Exam   Constitutional: He is oriented to person, place, and time. He appears well-developed and well-nourished.   HENT:   Head: Normocephalic and atraumatic.   Eyes: EOM are normal.   Neck: No JVD present.   Cardiovascular: Normal rate, regular rhythm and normal heart sounds.   Pulmonary/Chest: Effort normal and breath sounds normal.   Abdominal: Soft. Bowel sounds are normal.   No hepatosplenomegaly.   Musculoskeletal: Normal range of motion.   Lymphadenopathy:     He has no cervical adenopathy.   Neurological: He is alert and oriented to person, place, and time.   Skin: Skin is warm and dry.   Psychiatric: He has a normal mood  and affect.     CARDIAC STUDIES/PROCEDURES:     CARDIAC CATHETERIZATION CONCLUSIONS by Dr. Morales (08/25/11)  A 56-year-old male with the above clinical and cardiovascular history who has angiographic evidence of normal left ventricular function, normal wall motion, patent mid LAD stent and additional noncritical coronary artery disease. The patient will be continued on aggressive medical therapy.     CARDIAC CATHETERIZATION CONCLUSIONS (05/17/10)  1. Three-vessel coronary artery disease with high-grade in-stent restenosis of the mid LAD, ostial high-grade D2 stenosis originating from the high-grade in-stent restenosis territory, nonobstructive obtuse marginal, and RCA stenosis.   2. Successful stent placement of the mid LAD with 3.0 x 15-mm [place] drug-eluting stent.   3. Successful PTCA of the ostial D2.     CARDIAC CATHETERIZATION CONCLUSIONS by Dr. Khan (05/06/09)  1. Normal left ventricular systolic and diastolic function.   2. High grade complex stenosis proximal left anterior descending coronary artery status post successful stenting of that vessel and successful stenting of a dissection distal to the initial stent with a second stent as described above. (3.0 X 15 mm [place] drug eluding stents x 2)  3. Moderate disease in two of the diagonal branches of the left anterior descending artery described above.   4. Plaquing disease in the circumflex and right coronary arteries.     CAROTID ULTRASOUND (12/21/16)  Mild bilateral internal carotid artery stenosis (<50%).      CAROTID ULTRASOUND (07/15/09)  Mild plaques noted.     ECHOCARDIOGRAM CONCLUSIONS (12/21/16)  Normal transthoracic echocardiogram.   Compared to the images of the prior study done  8/5/2009, there has   been no significant change, there is no longer features of mild   diastolic dysfunction.     ECHOCARDIOGRAM (08/05/09)  Unremarkable echocardiogram with ejection fraction of 60%.     EKG performed on (10/17/19) was reviewed: EKG personally  interpreted shows sinus bradycardia.  EKG performed on (08/25/11) EKG shows normal sinus rhythm     Laboratory results of (12/13/19) were reviewed. Cholesterol profile of 133/97/50/64 noted.  Laboratory results of (12/11/18) Cholesterol profile of 109/108/39/48 noted.   Laboratory results of (01/19/18) Cholesterol profile of 127/151/37/60 noted.  Laboratory results of (02/08/17) Cholesterol profile of 123/99/37/67 noted.  Laboratory results of (11/28/16) Cholesterol profile of 156/114/46/87 noted.     LOWER EXTREMITY ARTERIAL ULTRASOUND (07/15/09)  Unremarkable arterial ultrasound.     MPI CONCLUSIONS (12/21/16)  Normal stress test.   No evidence of significant jeopardized viable myocardium or prior myocardial infarction.  Normal left ventricular size, ejection fraction, and wall motion.  ECG INTERPRETATION  Negative stress ECG for ischemia.     Assessment:     1. Coronary artery disease involving native coronary artery of native heart without angina pectoris     2. Stented coronary artery     3. Essential hypertension, benign     4. Dyslipidemia     5. Atherosclerosis of both carotid arteries       Medical Decision Making:  Today's Assessment / Status / Plan:     1. Coronary artery disease with stent placement: He is clinically doing well without recurrence of his angina.  We will continue with current medical care including aspirin, metoprolol, ramipril and pravastatin.  2. Hypertension: Blood pressure has been high. We will increase ramipril and reassess the blood pressure. We will continue with beta blockade therapy and angiotensin receptor blockade.  3. Hyperlipidemia: He is doing well on statin therapy without myalgia symptoms.  4. Carotid artery stenosis: Clinically stable on above medical therapy.    We will follow up the patient in one year.    CC Rosalba Manley

## 2020-04-16 DIAGNOSIS — I10 ESSENTIAL HYPERTENSION: ICD-10-CM

## 2020-08-01 DIAGNOSIS — I10 ESSENTIAL HYPERTENSION: ICD-10-CM

## 2020-08-03 RX ORDER — RAMIPRIL 2.5 MG/1
CAPSULE ORAL
Qty: 180 CAP | Refills: 2 | Status: SHIPPED | OUTPATIENT
Start: 2020-08-03 | End: 2021-03-11

## 2020-10-26 DIAGNOSIS — E78.5 DYSLIPIDEMIA: ICD-10-CM

## 2020-10-28 RX ORDER — PRAVASTATIN SODIUM 20 MG
TABLET ORAL
Qty: 90 TAB | Refills: 2 | Status: SHIPPED | OUTPATIENT
Start: 2020-10-28 | End: 2021-05-10

## 2021-01-06 LAB
ALBUMIN SERPL-MCNC: 4.4 G/DL (ref 3.8–4.8)
ALBUMIN/GLOB SERPL: 2.1 {RATIO} (ref 1.2–2.2)
ALP SERPL-CCNC: 58 IU/L (ref 39–117)
ALT SERPL-CCNC: 32 IU/L (ref 0–44)
AST SERPL-CCNC: 18 IU/L (ref 0–40)
BILIRUB SERPL-MCNC: 0.9 MG/DL (ref 0–1.2)
BUN SERPL-MCNC: 24 MG/DL (ref 8–27)
BUN/CREAT SERPL: 22 (ref 10–24)
CALCIUM SERPL-MCNC: 9.2 MG/DL (ref 8.6–10.2)
CHLORIDE SERPL-SCNC: 103 MMOL/L (ref 96–106)
CHOLEST SERPL-MCNC: 126 MG/DL (ref 100–199)
CO2 SERPL-SCNC: 20 MMOL/L (ref 20–29)
CREAT SERPL-MCNC: 1.08 MG/DL (ref 0.76–1.27)
GLOBULIN SER CALC-MCNC: 2.1 G/DL (ref 1.5–4.5)
GLUCOSE SERPL-MCNC: 147 MG/DL (ref 65–99)
HDLC SERPL-MCNC: 39 MG/DL
LABORATORY COMMENT REPORT: ABNORMAL
LDLC SERPL CALC-MCNC: 70 MG/DL (ref 0–99)
POTASSIUM SERPL-SCNC: 4.4 MMOL/L (ref 3.5–5.2)
PROT SERPL-MCNC: 6.5 G/DL (ref 6–8.5)
SODIUM SERPL-SCNC: 138 MMOL/L (ref 134–144)
TRIGL SERPL-MCNC: 89 MG/DL (ref 0–149)
VLDLC SERPL CALC-MCNC: 17 MG/DL (ref 5–40)

## 2021-02-01 ENCOUNTER — OFFICE VISIT (OUTPATIENT)
Dept: CARDIOLOGY | Facility: MEDICAL CENTER | Age: 66
End: 2021-02-01
Payer: MEDICARE

## 2021-02-01 VITALS
BODY MASS INDEX: 33.57 KG/M2 | DIASTOLIC BLOOD PRESSURE: 82 MMHG | SYSTOLIC BLOOD PRESSURE: 132 MMHG | RESPIRATION RATE: 18 BRPM | OXYGEN SATURATION: 95 % | WEIGHT: 247.5 LBS | HEART RATE: 68 BPM

## 2021-02-01 DIAGNOSIS — I25.10 CORONARY ARTERY DISEASE INVOLVING NATIVE CORONARY ARTERY OF NATIVE HEART WITHOUT ANGINA PECTORIS: ICD-10-CM

## 2021-02-01 DIAGNOSIS — I65.23 ATHEROSCLEROSIS OF BOTH CAROTID ARTERIES: ICD-10-CM

## 2021-02-01 DIAGNOSIS — I10 ESSENTIAL HYPERTENSION, BENIGN: ICD-10-CM

## 2021-02-01 DIAGNOSIS — E78.5 DYSLIPIDEMIA: ICD-10-CM

## 2021-02-01 DIAGNOSIS — Z95.5 STENTED CORONARY ARTERY: ICD-10-CM

## 2021-02-01 PROCEDURE — 99214 OFFICE O/P EST MOD 30 MIN: CPT | Performed by: INTERNAL MEDICINE

## 2021-02-01 ASSESSMENT — ENCOUNTER SYMPTOMS
MYALGIAS: 0
CARDIOVASCULAR NEGATIVE: 1
EYES NEGATIVE: 1
FOCAL WEAKNESS: 0
ABDOMINAL PAIN: 0
MUSCULOSKELETAL NEGATIVE: 1
BRUISES/BLEEDS EASILY: 0
PALPITATIONS: 0
CLAUDICATION: 0
DEPRESSION: 0
COUGH: 0
RESPIRATORY NEGATIVE: 1
WEAKNESS: 0
CHILLS: 0
VOMITING: 0
FEVER: 0
CONSTITUTIONAL NEGATIVE: 1
WEIGHT LOSS: 0
PSYCHIATRIC NEGATIVE: 1
SHORTNESS OF BREATH: 0
BLURRED VISION: 0
DOUBLE VISION: 0
NERVOUS/ANXIOUS: 0
NAUSEA: 0
DIZZINESS: 0
HEADACHES: 0
NEUROLOGICAL NEGATIVE: 1
GASTROINTESTINAL NEGATIVE: 1

## 2021-02-01 NOTE — PROGRESS NOTES
Chief Complaint   Patient presents with   • Coronary Artery Disease   • Aortic Stenosis     F/V Dx: Stented coronary artery   • Chest Pain       Subjective:   Socrates Falk is a 65 y.o. male who presents today for annual follow up of coronary artery disease with prior stent placement    Since the patient's last visit on 01/30/20, he has been doing well clinically. He denies chest pain, shortness of breath, palpitations, nausea/vomiting or diaphoresis. He has gained weight and whishes to get back on his weight reduction medications.    Past Medical History:   Diagnosis Date   • Angina    • Backpain    • CAD (coronary artery disease)    • Chest pain, unspecified    • Coronary atherosclerosis of native coronary artery    • Esophageal reflux    • Esophageal reflux    • Essential hypertension, benign    • Hypertension    • Nonspecific abnormal unspecified cardiovascular function study    • Occlusion and stenosis of carotid artery without mention of cerebral infarction    • Other and unspecified angina pectoris    • Other and unspecified hyperlipidemia    • Personal history of arthritis    • Unspecified essential hypertension      Past Surgical History:   Procedure Laterality Date   • OTHER CARDIAC SURGERY  05/2010    stent x 1   • OTHER CARDIAC SURGERY  05/2009    stent x 2   • HERNIA REPAIR     • OTHER     • OTHER ABDOMINAL SURGERY      hernia   • PB REMOVAL OF TONSILS,<12 Y/O     • TONSILLECTOMY       Family History   Problem Relation Age of Onset   • Heart Disease Mother         cad   • Heart Disease Father         Aortic Aneurysm/cabg   • Heart Disease Brother         cabg     Social History     Socioeconomic History   • Marital status:      Spouse name: Not on file   • Number of children: Not on file   • Years of education: Not on file   • Highest education level: Not on file   Occupational History   • Not on file   Social Needs   • Financial resource strain: Not on file   • Food insecurity     Worry: Not  on file     Inability: Not on file   • Transportation needs     Medical: Not on file     Non-medical: Not on file   Tobacco Use   • Smoking status: Former Smoker     Quit date: 1/1/2006     Years since quitting: 15.0   • Smokeless tobacco: Former User     Types: Chew     Quit date: 1/1/1990   • Tobacco comment: 10/2008   Substance and Sexual Activity   • Alcohol use: No   • Drug use: No   • Sexual activity: Not on file   Lifestyle   • Physical activity     Days per week: Not on file     Minutes per session: Not on file   • Stress: Not on file   Relationships   • Social connections     Talks on phone: Not on file     Gets together: Not on file     Attends Anglican service: Not on file     Active member of club or organization: Not on file     Attends meetings of clubs or organizations: Not on file     Relationship status: Not on file   • Intimate partner violence     Fear of current or ex partner: Not on file     Emotionally abused: Not on file     Physically abused: Not on file     Forced sexual activity: Not on file   Other Topics Concern   • Not on file   Social History Narrative   • Not on file     No Known Allergies     (Medications reviewed.)  Outpatient Encounter Medications as of 2/1/2021   Medication Sig Dispense Refill   • pravastatin (PRAVACHOL) 20 MG Tab TAKE ONE TABLET BY MOUTH DAILY 90 Tab 2   • ramipril (ALTACE) 2.5 MG Cap TAKE TWO CAPSULES BY MOUTH DAILY 180 Cap 2   • metoprolol (LOPRESSOR) 25 MG Tab TAKE ONE TABLET BY MOUTH TWICE A  Tab 3   • tramadol (ULTRAM) 50 MG Tab      • celecoxib (CELEBREX) 200 MG Cap      • methocarbamol (ROBAXIN) 750 MG Tab      • cyanocobalamin (VITAMIN B-12) 500 MCG Tab Take 500 mcg by mouth every day.     • Naproxen Sodium 220 MG Cap Take  by mouth.     • tamsulosin (FLOMAX) 0.4 MG capsule Take 0.4 mg by mouth every day.     • psyllium (METAMUCIL) 58.12 % Pack Take 1 Packet by mouth every day.     • omeprazole (PRILOSEC) 20 MG CPDR TAKE ONE CAPSULE BY MOUTH EVERY  DAY 90 Cap 3   • aspirin (ASA) 81 MG CHEW Take 81 mg by mouth every day.       No facility-administered encounter medications on file as of 2/1/2021.      Review of Systems   Constitutional: Negative.  Negative for chills, fever, malaise/fatigue and weight loss.   HENT: Negative.  Negative for hearing loss.    Eyes: Negative.  Negative for blurred vision and double vision.   Respiratory: Negative.  Negative for cough and shortness of breath.    Cardiovascular: Negative.  Negative for chest pain, palpitations, claudication and leg swelling.   Gastrointestinal: Negative.  Negative for abdominal pain, nausea and vomiting.   Genitourinary: Negative.  Negative for dysuria and urgency.   Musculoskeletal: Negative.  Negative for joint pain and myalgias.   Skin: Negative.  Negative for itching and rash.   Neurological: Negative.  Negative for dizziness, focal weakness, weakness and headaches.   Endo/Heme/Allergies: Negative.  Does not bruise/bleed easily.   Psychiatric/Behavioral: Negative.  Negative for depression. The patient is not nervous/anxious.    Weight gain.     Objective:   /82 (BP Location: Left arm, Patient Position: Sitting, BP Cuff Size: Adult)   Pulse 68   Resp 18   Wt 112 kg (247 lb 8 oz)   SpO2 95%   BMI 33.57 kg/m²     Physical Exam   Constitutional: He is oriented to person, place, and time. He appears well-developed and well-nourished.   HENT:   Head: Normocephalic and atraumatic.   Eyes: EOM are normal.   Neck: No JVD present.   Cardiovascular: Normal rate, regular rhythm and normal heart sounds.   Pulmonary/Chest: Effort normal and breath sounds normal.   Abdominal: Soft. Bowel sounds are normal.   No hepatosplenomegaly.   Musculoskeletal: Normal range of motion.   Lymphadenopathy:     He has no cervical adenopathy.   Neurological: He is alert and oriented to person, place, and time.   Skin: Skin is warm and dry.   Psychiatric: He has a normal mood and affect.     CARDIAC  STUDIES/PROCEDURES:     CARDIAC CATHETERIZATION CONCLUSIONS by Dr. Morales (08/25/11)  A 56-year-old male with the above clinical and cardiovascular history who has angiographic evidence of normal left ventricular function, normal wall motion, patent mid LAD stent and additional noncritical coronary artery disease. The patient will be continued on aggressive medical therapy.     CARDIAC CATHETERIZATION CONCLUSIONS (05/17/10)  1. Three-vessel coronary artery disease with high-grade in-stent restenosis of the mid LAD, ostial high-grade D2 stenosis originating from the high-grade in-stent restenosis territory, nonobstructive obtuse marginal, and RCA stenosis.   2. Successful stent placement of the mid LAD with 3.0 x 15-mm [place] drug-eluting stent.   3. Successful PTCA of the ostial D2.     CARDIAC CATHETERIZATION CONCLUSIONS by Dr. Khan (05/06/09)  1. Normal left ventricular systolic and diastolic function.   2. High grade complex stenosis proximal left anterior descending coronary artery status post successful stenting of that vessel and successful stenting of a dissection distal to the initial stent with a second stent as described above. (3.0 X 15 mm Xience drug eluding stents x 2)  3. Moderate disease in two of the diagonal branches of the left anterior descending artery described above.   4. Plaquing disease in the circumflex and right coronary arteries.     CAROTID ULTRASOUND (12/21/16)  Mild bilateral internal carotid artery stenosis (<50%).      CAROTID ULTRASOUND (07/15/09)  Mild plaques noted.     ECHOCARDIOGRAM CONCLUSIONS (12/21/16)  Normal transthoracic echocardiogram.   Compared to the images of the prior study done  8/5/2009, there has   been no significant change, there is no longer features of mild   diastolic dysfunction.     ECHOCARDIOGRAM (08/05/09)  Unremarkable echocardiogram with ejection fraction of 60%.     EKG performed on (10/17/19) was reviewed: EKG personally interpreted shows sinus  bradycardia.  EKG performed on (08/25/11) EKG shows normal sinus rhythm     Laboratory results of (01/05/21) were reviewed. Cholesterol profile of 126/89/39/70 mg/dL noted.  Laboratory results of (12/13/19) Cholesterol profile of 133/97/50/64 noted.  Laboratory results of (12/11/18) Cholesterol profile of 109/108/39/48 noted.   Laboratory results of (01/19/18) Cholesterol profile of 127/151/37/60 noted.  Laboratory results of (02/08/17) Cholesterol profile of 123/99/37/67 noted.  Laboratory results of (11/28/16) Cholesterol profile of 156/114/46/87 noted.     LOWER EXTREMITY ARTERIAL ULTRASOUND (07/15/09)  Unremarkable arterial ultrasound.     MPI CONCLUSIONS (12/21/16)  Normal stress test.   No evidence of significant jeopardized viable myocardium or prior myocardial infarction.  Normal left ventricular size, ejection fraction, and wall motion.  ECG INTERPRETATION  Negative stress ECG for ischemia.    Assessment:     1. Coronary artery disease involving native coronary artery of native heart without angina pectoris     2. Stented coronary artery     3. Essential hypertension, benign     4. Dyslipidemia     5. Atherosclerosis of both carotid arteries         Medical Decision Making:  Today's Assessment / Status / Plan:     1. Coronary artery disease with stent placement: He is clinically doing well without recurrence of his angina.  We will continue with current medical care including aspirin, metoprolol, ramipril and pravastatin.  2. Hypertension: Blood pressure is well controlled. We will continue with beta blockade therapy and ace inhibitor therapy.  3. Hyperlipidemia: He is doing well on statin therapy without myalgia symptoms.  4. Carotid artery stenosis: Clinically stable on above medical therapy.  5. Additional information: He and his wife lives in [place].  6. Health maintenance: He may go back on his weight reduction medications knowing his cardiac risks and benefits.     We will follow up the patient  in one year.    CC Rosalba Keys

## 2021-03-09 DIAGNOSIS — I10 ESSENTIAL HYPERTENSION: ICD-10-CM

## 2021-03-11 DIAGNOSIS — I10 ESSENTIAL HYPERTENSION: ICD-10-CM

## 2021-03-11 RX ORDER — RAMIPRIL 2.5 MG/1
CAPSULE ORAL
Qty: 180 CAPSULE | Refills: 3 | Status: CANCELLED | OUTPATIENT
Start: 2021-03-11

## 2021-03-11 RX ORDER — RAMIPRIL 5 MG/1
5 CAPSULE ORAL DAILY
Qty: 90 CAPSULE | Refills: 3 | Status: SHIPPED
Start: 2021-03-11 | End: 2021-04-27

## 2021-03-30 ENCOUNTER — TELEPHONE (OUTPATIENT)
Dept: CARDIOLOGY | Facility: MEDICAL CENTER | Age: 66
End: 2021-03-30

## 2021-03-30 NOTE — TELEPHONE ENCOUNTER
Spoke to \[facility]\"" pharmacy, and they said the inappropriately filled an old 2.5mg dose prescription. She advised she has the order for the 5mg strength and will de-activate the previous prescription.     Called patient to notify. He will take 2 2.5mg capsules until he runs out.

## 2021-03-30 NOTE — TELEPHONE ENCOUNTER
BRANDO    Patient wife, Nisha, called to advise they pharmacy does not have the new RX for ramipril (ALTACE) 5 MG Cap. They only have the 2.5 one. Please refax the request.    Thank you,  Janet BLUM

## 2021-05-09 DIAGNOSIS — I10 ESSENTIAL HYPERTENSION: ICD-10-CM

## 2021-05-09 DIAGNOSIS — E78.5 DYSLIPIDEMIA: ICD-10-CM

## 2021-05-10 RX ORDER — PRAVASTATIN SODIUM 20 MG
TABLET ORAL
Qty: 90 TABLET | Refills: 1 | Status: SHIPPED
Start: 2021-05-10 | End: 2022-02-11

## 2021-05-12 ENCOUNTER — PATIENT MESSAGE (OUTPATIENT)
Dept: CARDIOLOGY | Facility: MEDICAL CENTER | Age: 66
End: 2021-05-12

## 2021-05-12 DIAGNOSIS — I10 ESSENTIAL HYPERTENSION: ICD-10-CM

## 2022-02-11 ENCOUNTER — OFFICE VISIT (OUTPATIENT)
Dept: CARDIOLOGY | Facility: MEDICAL CENTER | Age: 67
End: 2022-02-11
Payer: MEDICARE

## 2022-02-11 VITALS
SYSTOLIC BLOOD PRESSURE: 130 MMHG | HEART RATE: 52 BPM | RESPIRATION RATE: 18 BRPM | HEIGHT: 72 IN | DIASTOLIC BLOOD PRESSURE: 80 MMHG | WEIGHT: 249 LBS | BODY MASS INDEX: 33.72 KG/M2 | OXYGEN SATURATION: 96 %

## 2022-02-11 DIAGNOSIS — Z95.5 STENTED CORONARY ARTERY: ICD-10-CM

## 2022-02-11 DIAGNOSIS — I10 ESSENTIAL HYPERTENSION, BENIGN: ICD-10-CM

## 2022-02-11 DIAGNOSIS — I25.10 CORONARY ARTERY DISEASE INVOLVING NATIVE CORONARY ARTERY OF NATIVE HEART WITHOUT ANGINA PECTORIS: ICD-10-CM

## 2022-02-11 DIAGNOSIS — I65.23 ATHEROSCLEROSIS OF BOTH CAROTID ARTERIES: ICD-10-CM

## 2022-02-11 DIAGNOSIS — E78.5 DYSLIPIDEMIA: ICD-10-CM

## 2022-02-11 PROCEDURE — 99214 OFFICE O/P EST MOD 30 MIN: CPT | Performed by: INTERNAL MEDICINE

## 2022-02-11 RX ORDER — RAMIPRIL 10 MG/1
10 CAPSULE ORAL DAILY
Qty: 90 CAPSULE | Refills: 3 | Status: SHIPPED | OUTPATIENT
Start: 2022-02-11 | End: 2023-12-01 | Stop reason: SDUPTHER

## 2022-02-11 RX ORDER — ROSUVASTATIN CALCIUM 10 MG/1
10 TABLET, COATED ORAL EVERY EVENING
Qty: 30 TABLET | Refills: 11 | Status: SHIPPED | OUTPATIENT
Start: 2022-02-11 | End: 2022-05-11 | Stop reason: SDUPTHER

## 2022-02-11 ASSESSMENT — ENCOUNTER SYMPTOMS
MYALGIAS: 0
HEADACHES: 0
BRUISES/BLEEDS EASILY: 0
PSYCHIATRIC NEGATIVE: 1
GASTROINTESTINAL NEGATIVE: 1
EYES NEGATIVE: 1
NERVOUS/ANXIOUS: 0
CARDIOVASCULAR NEGATIVE: 1
WEIGHT LOSS: 0
VOMITING: 0
SHORTNESS OF BREATH: 0
RESPIRATORY NEGATIVE: 1
CLAUDICATION: 0
DEPRESSION: 0
PALPITATIONS: 0
WEAKNESS: 0
CONSTITUTIONAL NEGATIVE: 1
DIZZINESS: 0
FEVER: 0
MUSCULOSKELETAL NEGATIVE: 1
ABDOMINAL PAIN: 0
FOCAL WEAKNESS: 0
BLURRED VISION: 0
CHILLS: 0
COUGH: 0
NEUROLOGICAL NEGATIVE: 1
DOUBLE VISION: 0
NAUSEA: 0

## 2022-02-11 NOTE — PROGRESS NOTES
Chief Complaint   Patient presents with   • Coronary Artery Disease   • Chest Pain       Subjective     Socrates Falk is a 67 y.o. male who presents today for annual follow up of coronary artery disease with prior stent placement.    Since the patient's last visit on 21, he has been doing well clinically. He denies chest pain, shortness of breath, palpitations, nausea/vomiting or diaphoresis. He keeps active hunting with his son in [place] in snow.     Past Medical History:   Diagnosis Date   • Angina    • Backpain    • CAD (coronary artery disease)    • Chest pain, unspecified    • Coronary atherosclerosis of native coronary artery    • Esophageal reflux    • Esophageal reflux    • Essential hypertension, benign    • Hypertension    • Nonspecific abnormal unspecified cardiovascular function study    • Occlusion and stenosis of carotid artery without mention of cerebral infarction    • Other and unspecified angina pectoris    • Other and unspecified hyperlipidemia    • Personal history of arthritis    • Unspecified essential hypertension      Past Surgical History:   Procedure Laterality Date   • OTHER CARDIAC SURGERY  2010    stent x 1   • OTHER CARDIAC SURGERY  2009    stent x 2   • HERNIA REPAIR     • OTHER     • OTHER ABDOMINAL SURGERY      hernia   • AR REMOVAL OF TONSILS,<10 Y/O     • TONSILLECTOMY       Family History   Problem Relation Age of Onset   • Heart Disease Mother         cad   • Heart Disease Father         Aortic Aneurysm/cabg   • Heart Disease Brother         cabg     Social History     Socioeconomic History   • Marital status:      Spouse name: Not on file   • Number of children: Not on file   • Years of education: Not on file   • Highest education level: Not on file   Occupational History   • Not on file   Tobacco Use   • Smoking status: Former Smoker     Quit date: 2006     Years since quittin.1   • Smokeless tobacco: Former User     Types: Chew     Quit date:  1/1/1990   • Tobacco comment: 10/2008   Vaping Use   • Vaping Use: Never used   Substance and Sexual Activity   • Alcohol use: No   • Drug use: No   • Sexual activity: Not on file   Other Topics Concern   • Not on file   Social History Narrative   • Not on file     Social Determinants of Health     Financial Resource Strain:    • Difficulty of Paying Living Expenses: Not on file   Food Insecurity:    • Worried About Running Out of Food in the Last Year: Not on file   • Ran Out of Food in the Last Year: Not on file   Transportation Needs:    • Lack of Transportation (Medical): Not on file   • Lack of Transportation (Non-Medical): Not on file   Physical Activity:    • Days of Exercise per Week: Not on file   • Minutes of Exercise per Session: Not on file   Stress:    • Feeling of Stress : Not on file   Social Connections:    • Frequency of Communication with Friends and Family: Not on file   • Frequency of Social Gatherings with Friends and Family: Not on file   • Attends Evangelical Services: Not on file   • Active Member of Clubs or Organizations: Not on file   • Attends Club or Organization Meetings: Not on file   • Marital Status: Not on file   Intimate Partner Violence:    • Fear of Current or Ex-Partner: Not on file   • Emotionally Abused: Not on file   • Physically Abused: Not on file   • Sexually Abused: Not on file   Housing Stability:    • Unable to Pay for Housing in the Last Year: Not on file   • Number of Places Lived in the Last Year: Not on file   • Unstable Housing in the Last Year: Not on file     No Known Allergies     (Medications reviewed.)  Outpatient Encounter Medications as of 2/11/2022   Medication Sig Dispense Refill   • FIBER PO Take  by mouth every day.     • metoprolol tartrate (LOPRESSOR) 25 MG Tab Take 1 tablet by mouth 2 times a day. 180 tablet 2   • pravastatin (PRAVACHOL) 20 MG Tab TAKE ONE TABLET BY MOUTH DAILY 90 tablet 1   • ramipril (ALTACE) 5 MG Cap Take 1 capsule by mouth every  day. 90 capsule 3   • tramadol (ULTRAM) 50 MG Tab      • celecoxib (CELEBREX) 200 MG Cap      • methocarbamol (ROBAXIN) 750 MG Tab      • cyanocobalamin (VITAMIN B-12) 500 MCG Tab Take 500 mcg by mouth every day.     • Naproxen Sodium 220 MG Cap Take  by mouth.     • tamsulosin (FLOMAX) 0.4 MG capsule Take 0.4 mg by mouth every day.     • omeprazole (PRILOSEC) 20 MG CPDR TAKE ONE CAPSULE BY MOUTH EVERY DAY 90 Cap 3   • aspirin (ASA) 81 MG CHEW Take 81 mg by mouth every day.     • [DISCONTINUED] psyllium (METAMUCIL) 58.12 % Pack Take 1 Packet by mouth every day. (Patient not taking: Reported on 2/11/2022)       No facility-administered encounter medications on file as of 2/11/2022.     Review of Systems   Constitutional: Negative.  Negative for chills, fever, malaise/fatigue and weight loss.   HENT: Negative.  Negative for hearing loss.    Eyes: Negative.  Negative for blurred vision and double vision.   Respiratory: Negative.  Negative for cough and shortness of breath.    Cardiovascular: Negative.  Negative for chest pain, palpitations, claudication and leg swelling.   Gastrointestinal: Negative.  Negative for abdominal pain, nausea and vomiting.   Genitourinary: Negative.  Negative for dysuria and urgency.   Musculoskeletal: Negative.  Negative for joint pain and myalgias.   Skin: Negative.  Negative for itching and rash.   Neurological: Negative.  Negative for dizziness, focal weakness, weakness and headaches.   Endo/Heme/Allergies: Negative.  Does not bruise/bleed easily.   Psychiatric/Behavioral: Negative.  Negative for depression. The patient is not nervous/anxious.               Objective     /80 (BP Location: Left arm, Patient Position: Sitting, BP Cuff Size: Adult)   Pulse (!) 52   Resp 18   Ht 1.829 m (6')   Wt 113 kg (249 lb)   SpO2 96%   BMI 33.77 kg/m²     Physical Exam  Constitutional:       Appearance: He is well-developed.   HENT:      Head: Normocephalic and atraumatic.   Neck:       Vascular: No JVD.   Cardiovascular:      Rate and Rhythm: Normal rate and regular rhythm.      Heart sounds: Normal heart sounds.   Pulmonary:      Effort: Pulmonary effort is normal.      Breath sounds: Normal breath sounds.   Abdominal:      General: Bowel sounds are normal.      Palpations: Abdomen is soft.      Comments: No hepatosplenomegaly.   Musculoskeletal:         General: Normal range of motion.   Lymphadenopathy:      Cervical: No cervical adenopathy.   Skin:     General: Skin is warm and dry.   Neurological:      Mental Status: He is alert and oriented to person, place, and time.            CARDIAC STUDIES/PROCEDURES:     CARDIAC CATHETERIZATION CONCLUSIONS by Dr. Morales (08/25/11)  A 56-year-old male with the above clinical and cardiovascular history who has angiographic evidence of normal left ventricular function, normal wall motion, patent mid LAD stent and additional noncritical coronary artery disease. The patient will be continued on aggressive medical therapy.     CARDIAC CATHETERIZATION CONCLUSIONS (05/17/10)  1. Three-vessel coronary artery disease with high-grade in-stent restenosis of the mid LAD, ostial high-grade D2 stenosis originating from the high-grade in-stent restenosis territory, nonobstructive obtuse marginal, and RCA stenosis.   2. Successful stent placement of the mid LAD with 3.0 x 15-mm [place] drug-eluting stent.   3. Successful PTCA of the ostial D2.     CARDIAC CATHETERIZATION CONCLUSIONS by Dr. Khan (05/06/09)  1. Normal left ventricular systolic and diastolic function.   2. High grade complex stenosis proximal left anterior descending coronary artery status post successful stenting of that vessel and successful stenting of a dissection distal to the initial stent with a second stent as described above. (3.0 X 15 mm Xience drug eluding stents x 2)  3. Moderate disease in two of the diagonal branches of the left anterior descending artery described above.   4. Plaquing  disease in the circumflex and right coronary arteries.     CAROTID ULTRASOUND (12/21/16)  Mild bilateral internal carotid artery stenosis (<50%).      CAROTID ULTRASOUND (07/15/09)  Mild plaques noted.     ECHOCARDIOGRAM CONCLUSIONS (12/21/16)  Normal transthoracic echocardiogram.   Compared to the images of the prior study done  8/5/2009, there has   been no significant change, there is no longer features of mild   diastolic dysfunction.     ECHOCARDIOGRAM (08/05/09)  Unremarkable echocardiogram with ejection fraction of 60%.     EKG performed on (10/17/19) EKG shows sinus bradycardia.  EKG performed on (08/25/11) EKG shows normal sinus rhythm     Laboratory results of (01/25/22) were reviewed. Cholesterol profile of 156/132/43/90 mg/dL noted.  Laboratory results of (01/05/21) Cholesterol profile of 126/89/39/70 mg/dL noted.  Laboratory results of (12/13/19) Cholesterol profile of 133/97/50/64 noted.  Laboratory results of (12/11/18) Cholesterol profile of 109/108/39/48 noted.   Laboratory results of (01/19/18) Cholesterol profile of 127/151/37/60 noted.  Laboratory results of (02/08/17) Cholesterol profile of 123/99/37/67 noted.  Laboratory results of (11/28/16) Cholesterol profile of 156/114/46/87 noted.     LOWER EXTREMITY ARTERIAL ULTRASOUND (07/15/09)  Unremarkable arterial ultrasound.     MPI CONCLUSIONS (12/21/16)  Normal stress test.   No evidence of significant jeopardized viable myocardium or prior myocardial infarction.  Normal left ventricular size, ejection fraction, and wall motion.  ECG INTERPRETATION  Negative stress ECG for ischemia.    Assessment & Plan     1. Coronary artery disease involving native coronary artery of native heart without angina pectoris     2. Stented coronary artery     3. Essential hypertension, benign     4. Dyslipidemia     5. Atherosclerosis of both carotid arteries         Medical Decision Making: Today's Assessment/Status/Plan:        1. Coronary artery disease with stent  placement: He is clinically doing well without recurrence of her angina.  We will continue with current medical care including aspirin, metoprolol, ramipril and pravastatin. We will repeat an echocardiogram and myocardial perfusion imaging study.  2. Hypertension: Blood pressure has been high. We will increase ramipril to 10 mg QD and reassess the blood pressure.  3. Hyperlipidemia: He is doing well on statin therapy without myalgia symptoms. His LDL level is not at target. We will change pravastatin to rosuvastatin 10 mg QHS. We will repeat labs including fasting lipid profile.  4. Carotid artery stenosis: Clinically stable on above medical therapy.  5. Additional information: He and his wife lives in [place].  6. Health maintenance: He may go back on his weight reduction medications knowing his cardiac risks and benefits.      We will follow up the patient in one year.     CC Rosalba Keys

## 2022-02-24 ENCOUNTER — TELEPHONE (OUTPATIENT)
Dept: CARDIOLOGY | Facility: MEDICAL CENTER | Age: 67
End: 2022-02-24
Payer: MEDICARE

## 2022-02-24 NOTE — TELEPHONE ENCOUNTER
----- Message from Marco A Rea M.D. sent at 2/23/2022 12:40 PM PST -----  Please call with unremarkable myocardial perfusion imaging study ordered for coronary artery disease with prior stent placement showing inferior scar only.    Thanks.  BRANDO

## 2022-05-11 ENCOUNTER — OFFICE VISIT (OUTPATIENT)
Dept: CARDIOLOGY | Facility: MEDICAL CENTER | Age: 67
End: 2022-05-11
Payer: MEDICARE

## 2022-05-11 VITALS
WEIGHT: 228.2 LBS | SYSTOLIC BLOOD PRESSURE: 118 MMHG | HEIGHT: 72 IN | BODY MASS INDEX: 30.91 KG/M2 | HEART RATE: 48 BPM | DIASTOLIC BLOOD PRESSURE: 70 MMHG | RESPIRATION RATE: 14 BRPM | OXYGEN SATURATION: 95 %

## 2022-05-11 DIAGNOSIS — E78.5 DYSLIPIDEMIA: ICD-10-CM

## 2022-05-11 DIAGNOSIS — I25.10 CORONARY ARTERY DISEASE INVOLVING NATIVE CORONARY ARTERY OF NATIVE HEART WITHOUT ANGINA PECTORIS: ICD-10-CM

## 2022-05-11 DIAGNOSIS — I10 ESSENTIAL HYPERTENSION, BENIGN: ICD-10-CM

## 2022-05-11 DIAGNOSIS — Z95.5 STENTED CORONARY ARTERY: ICD-10-CM

## 2022-05-11 DIAGNOSIS — I65.23 ATHEROSCLEROSIS OF BOTH CAROTID ARTERIES: ICD-10-CM

## 2022-05-11 PROCEDURE — 99214 OFFICE O/P EST MOD 30 MIN: CPT | Performed by: INTERNAL MEDICINE

## 2022-05-11 RX ORDER — ROSUVASTATIN CALCIUM 10 MG/1
10 TABLET, COATED ORAL EVERY EVENING
Qty: 90 TABLET | Refills: 3 | Status: SHIPPED | OUTPATIENT
Start: 2022-05-11 | End: 2023-11-08

## 2022-05-11 ASSESSMENT — ENCOUNTER SYMPTOMS
HEADACHES: 0
DEPRESSION: 0
WEIGHT LOSS: 1
CHILLS: 0
NERVOUS/ANXIOUS: 0
DIZZINESS: 0
MYALGIAS: 0
BLURRED VISION: 0
FEVER: 0
EYES NEGATIVE: 1
ABDOMINAL PAIN: 0
VOMITING: 0
COUGH: 0
WEAKNESS: 0
PALPITATIONS: 0
NAUSEA: 0
GASTROINTESTINAL NEGATIVE: 1
SHORTNESS OF BREATH: 0
BRUISES/BLEEDS EASILY: 0
DOUBLE VISION: 0
FOCAL WEAKNESS: 0
CARDIOVASCULAR NEGATIVE: 1
NEUROLOGICAL NEGATIVE: 1
MUSCULOSKELETAL NEGATIVE: 1
CLAUDICATION: 0
PSYCHIATRIC NEGATIVE: 1
RESPIRATORY NEGATIVE: 1

## 2022-05-11 NOTE — PROGRESS NOTES
Chief Complaint   Patient presents with   • Coronary Artery Disease     F/V Dx: Coronary artery disease involving native coronary artery of native heart without angina pectoris       Subjective     Socrates Falk is a 67 y.o. male who presents today for follow up of coronary artery disease with prior stent placement, study result review, hypertension, medication change evaluation, blood pressure assessment, dyslipidemia, medication change evaluation, lab results review.    Since the patient's last visit on 02/11/22, he has been doing well clinically. He denies chest pain, shortness of breath, palpitations, nausea/vomiting or diaphoresis. On the last visit his ramipril  was increased and his blood pressure is better controlled. He lost 30 lbs with diet and exercise. Also on the last visit he was started on rosuvastatin and is tolerating this medication well without side effects.    Past Medical History:   Diagnosis Date   • Angina    • Backpain    • CAD (coronary artery disease)    • Chest pain, unspecified    • Coronary atherosclerosis of native coronary artery    • Esophageal reflux    • Esophageal reflux    • Essential hypertension, benign    • Hypertension    • Nonspecific abnormal unspecified cardiovascular function study    • Occlusion and stenosis of carotid artery without mention of cerebral infarction    • Other and unspecified angina pectoris    • Other and unspecified hyperlipidemia    • Personal history of arthritis    • Unspecified essential hypertension      Past Surgical History:   Procedure Laterality Date   • OTHER CARDIAC SURGERY  05/2010    stent x 1   • OTHER CARDIAC SURGERY  05/2009    stent x 2   • HERNIA REPAIR     • OTHER     • OTHER ABDOMINAL SURGERY      hernia   • NE REMOVAL OF TONSILS,<10 Y/O     • TONSILLECTOMY       Family History   Problem Relation Age of Onset   • Heart Disease Mother         cad   • Heart Disease Father         Aortic Aneurysm/cabg   • Heart Disease Brother          cabg     Social History     Socioeconomic History   • Marital status:      Spouse name: Not on file   • Number of children: Not on file   • Years of education: Not on file   • Highest education level: Not on file   Occupational History   • Not on file   Tobacco Use   • Smoking status: Former Smoker     Quit date: 2006     Years since quittin.3   • Smokeless tobacco: Former User     Types: Chew     Quit date: 1990   • Tobacco comment: 10/2008   Vaping Use   • Vaping Use: Never used   Substance and Sexual Activity   • Alcohol use: No   • Drug use: No   • Sexual activity: Not on file   Other Topics Concern   • Not on file   Social History Narrative   • Not on file     Social Determinants of Health     Financial Resource Strain: Not on file   Food Insecurity: Not on file   Transportation Needs: Not on file   Physical Activity: Not on file   Stress: Not on file   Social Connections: Not on file   Intimate Partner Violence: Not on file   Housing Stability: Not on file     No Known Allergies     (Medications reviewed.)  Outpatient Encounter Medications as of 2022   Medication Sig Dispense Refill   • Cholecalciferol (VITAMIN D3 PO) Take 1,000 mg by mouth 2 times a day.     • Fluticasone Propionate (FLONASE NA) Administer  into affected nostril(S) as needed.     • FIBER PO Take  by mouth every day.     • ramipril (ALTACE) 10 MG capsule Take 1 Capsule by mouth every day. 90 Capsule 3   • rosuvastatin (CRESTOR) 10 MG Tab Take 1 Tablet by mouth every evening. 30 Tablet 11   • metoprolol tartrate (LOPRESSOR) 25 MG Tab Take 1 tablet by mouth 2 times a day. 180 tablet 2   • tramadol (ULTRAM) 50 MG Tab      • celecoxib (CELEBREX) 200 MG Cap      • methocarbamol (ROBAXIN) 750 MG Tab      • cyanocobalamin (VITAMIN B-12) 500 MCG Tab Take 500 mcg by mouth every day.     • Naproxen Sodium 220 MG Cap Take  by mouth 2 times a day.     • tamsulosin (FLOMAX) 0.4 MG capsule Take 0.4 mg by mouth every day.     •  omeprazole (PRILOSEC) 20 MG CPDR TAKE ONE CAPSULE BY MOUTH EVERY DAY 90 Cap 3   • aspirin (ASA) 81 MG CHEW Take 81 mg by mouth every day.       No facility-administered encounter medications on file as of 5/11/2022.     Review of Systems   Constitutional: Positive for weight loss. Negative for chills, fever and malaise/fatigue.   HENT: Negative.  Negative for hearing loss.    Eyes: Negative.  Negative for blurred vision and double vision.   Respiratory: Negative.  Negative for cough and shortness of breath.    Cardiovascular: Negative.  Negative for chest pain, palpitations, claudication and leg swelling.   Gastrointestinal: Negative.  Negative for abdominal pain, nausea and vomiting.   Genitourinary: Negative.  Negative for dysuria and urgency.   Musculoskeletal: Negative.  Negative for joint pain and myalgias.   Skin: Negative.  Negative for itching and rash.   Neurological: Negative.  Negative for dizziness, focal weakness, weakness and headaches.   Endo/Heme/Allergies: Negative.  Does not bruise/bleed easily.   Psychiatric/Behavioral: Negative.  Negative for depression. The patient is not nervous/anxious.               Objective     /70 (BP Location: Left arm, Patient Position: Sitting, BP Cuff Size: Adult)   Pulse (!) 48   Resp 14   Ht 1.829 m (6')   Wt 104 kg (228 lb 3.2 oz)   SpO2 95%   BMI 30.95 kg/m²     Physical Exam  Constitutional:       Appearance: He is well-developed.   HENT:      Head: Normocephalic and atraumatic.   Neck:      Vascular: No JVD.   Cardiovascular:      Rate and Rhythm: Normal rate and regular rhythm.      Heart sounds: Normal heart sounds.   Pulmonary:      Effort: Pulmonary effort is normal.      Breath sounds: Normal breath sounds.   Abdominal:      General: Bowel sounds are normal.      Palpations: Abdomen is soft.      Comments: No hepatosplenomegaly.   Musculoskeletal:         General: Normal range of motion.   Lymphadenopathy:      Cervical: No cervical adenopathy.    Skin:     General: Skin is warm and dry.   Neurological:      Mental Status: He is alert and oriented to person, place, and time.            CARDIAC STUDIES/PROCEDURES:     CARDIAC CATHETERIZATION CONCLUSIONS by Dr. Morales (08/25/11)  A 56-year-old male with the above clinical and cardiovascular history who has angiographic evidence of normal left ventricular function, normal wall motion, patent mid LAD stent and additional noncritical coronary artery disease. The patient will be continued on aggressive medical therapy.     CARDIAC CATHETERIZATION CONCLUSIONS (05/17/10)  1. Three-vessel coronary artery disease with high-grade in-stent restenosis of the mid LAD, ostial high-grade D2 stenosis originating from the high-grade in-stent restenosis territory, nonobstructive obtuse marginal, and RCA stenosis.   2. Successful stent placement of the mid LAD with 3.0 x 15-mm [place] drug-eluting stent.   3. Successful PTCA of the ostial D2.     CARDIAC CATHETERIZATION CONCLUSIONS by Dr. Khan (05/06/09)  1. Normal left ventricular systolic and diastolic function.   2. High grade complex stenosis proximal left anterior descending coronary artery status post successful stenting of that vessel and successful stenting of a dissection distal to the initial stent with a second stent as described above. (3.0 X 15 mm [place] drug eluding stents x 2)  3. Moderate disease in two of the diagonal branches of the left anterior descending artery described above.   4. Plaquing disease in the circumflex and right coronary arteries.     CAROTID ULTRASOUND (12/21/16)  Mild bilateral internal carotid artery stenosis (<50%).      CAROTID ULTRASOUND (07/15/09)  Mild plaques noted.    ECHOCARDIOGRAM CONCLUSIONS (02/23/22)  Compared to the prior echo on 12/22/2016, no significant changes.  Normal left ventricular size, wall thickness, systolic function, and   diastolic function.  The left ventricular ejection fraction is visually estimated to  be 70%.  No significant valvular disease.  Estimated right ventricular systolic pressure is 20 mmHg.  (study result reviewed)     ECHOCARDIOGRAM CONCLUSIONS (12/21/16)  Normal transthoracic echocardiogram.   Compared to the images of the prior study done  8/5/2009, there has   been no significant change, there is no longer features of mild   diastolic dysfunction.     ECHOCARDIOGRAM (08/05/09)  Unremarkable echocardiogram with ejection fraction of 60%.     EKG performed on (10/17/19) EKG shows sinus bradycardia.  EKG performed on (08/25/11) EKG shows normal sinus rhythm     Laboratory results of (05/04/22) were reviewed. Cholesterol profile of 88/65/41/34 mg/dL noted.  Laboratory results of (01/25/22) Cholesterol profile of 156/132/43/90 mg/dL noted.  Laboratory results of (01/05/21) Cholesterol profile of 126/89/39/70 mg/dL noted.  Laboratory results of (12/13/19) Cholesterol profile of 133/97/50/64 noted.  Laboratory results of (12/11/18) Cholesterol profile of 109/108/39/48 noted.   Laboratory results of (01/19/18) Cholesterol profile of 127/151/37/60 noted.  Laboratory results of (02/08/17) Cholesterol profile of 123/99/37/67 noted.  Laboratory results of (11/28/16) Cholesterol profile of 156/114/46/87 noted.     LOWER EXTREMITY ARTERIAL ULTRASOUND (07/15/09)  Unremarkable arterial ultrasound.    MYOCARDIAL PERFUSION STUDY CONCLUSIONS (02/23/22)  Remote infarct of the inferior myocardium.  No evidence of Lexiscan-induced ischemia.  Normal wall motion with normal ejection fraction at 71%  (study result reviewed)     MPI CONCLUSIONS (12/21/16)  Normal stress test.   No evidence of significant jeopardized viable myocardium or prior myocardial infarction.  Normal left ventricular size, ejection fraction, and wall motion.  ECG INTERPRETATION  Negative stress ECG for ischemia.    Assessment & Plan     1. Essential hypertension, benign     2. Coronary artery disease involving native coronary artery of native heart  without angina pectoris     3. Stented coronary artery     4. Dyslipidemia     5. Atherosclerosis of both carotid arteries         Medical Decision Making: Today's Assessment/Status/Plan:        1. Hypertension: Blood pressure is well controlled. We will continue with ramipril.  2. Coronary artery disease: He remains clinically doing well. I will continue with current medical care including aspirin, metoprolol, ramipril, rosuvastatin.  3. Hyperlipidemia: He is doing well on statin therapy without myalgia symptoms.  4. Carotid artery stenosis: Clinically stable on above medical therapy.  5. Additional information: He and his wife lives in [place].  6. Health maintenance: He may go back on his weight reduction medications knowing his cardiac risks and benefits.      He will follow up with [facility].      CC Rosalba Keys

## 2023-08-03 DIAGNOSIS — I25.10 CORONARY ARTERY DISEASE DUE TO LIPID RICH PLAQUE: ICD-10-CM

## 2023-08-03 DIAGNOSIS — I25.83 CORONARY ARTERY DISEASE DUE TO LIPID RICH PLAQUE: ICD-10-CM

## 2023-11-06 ENCOUNTER — TELEPHONE (OUTPATIENT)
Dept: CARDIOLOGY | Facility: MEDICAL CENTER | Age: 68
End: 2023-11-06
Payer: MEDICARE

## 2023-11-07 ENCOUNTER — PATIENT MESSAGE (OUTPATIENT)
Dept: CARDIOLOGY | Facility: MEDICAL CENTER | Age: 68
End: 2023-11-07
Payer: MEDICARE

## 2023-11-07 NOTE — TELEPHONE ENCOUNTER
----- Message from Josemanuel White M.D. sent at 11/3/2023 11:56 AM PDT -----  Please see what he wants to do about his cholesterol

## 2023-11-08 ENCOUNTER — PATIENT MESSAGE (OUTPATIENT)
Dept: CARDIOLOGY | Facility: MEDICAL CENTER | Age: 68
End: 2023-11-08
Payer: MEDICARE

## 2023-11-08 DIAGNOSIS — E78.5 DYSLIPIDEMIA: ICD-10-CM

## 2023-11-08 RX ORDER — ATORVASTATIN CALCIUM 40 MG/1
40 TABLET, FILM COATED ORAL NIGHTLY
Qty: 90 TABLET | Refills: 3 | Status: SHIPPED | OUTPATIENT
Start: 2023-11-08

## 2023-11-08 NOTE — PATIENT COMMUNICATION
Lab ordered.    Medication sent to preferred pharmacy as requested.    Replied to pt via [employer] regarding findings.

## 2023-12-01 PROBLEM — E11.9 TYPE 2 DIABETES MELLITUS, WITHOUT LONG-TERM CURRENT USE OF INSULIN (HCC): Status: ACTIVE | Noted: 2023-12-01

## 2024-02-21 DIAGNOSIS — E11.59 TYPE 2 DIABETES MELLITUS WITH OTHER CIRCULATORY COMPLICATION, WITHOUT LONG-TERM CURRENT USE OF INSULIN (HCC): ICD-10-CM

## 2024-02-21 RX ORDER — EMPAGLIFLOZIN 10 MG/1
10 TABLET, FILM COATED ORAL DAILY
Qty: 90 TABLET | Refills: 3 | Status: SHIPPED | OUTPATIENT
Start: 2024-02-21

## 2024-02-21 NOTE — TELEPHONE ENCOUNTER
Is the patient due for a refill? Yes    Was the patient seen the past year? Yes    Date of last office visit: 12/01/23    Does the patient have an upcoming appointment?  No    Provider to refill:CW    Does the patients insurance require a 100 day supply?  No    Pharmacy Change.

## 2024-07-02 ENCOUNTER — TELEPHONE (OUTPATIENT)
Dept: CARDIOLOGY | Facility: MEDICAL CENTER | Age: 69
End: 2024-07-02
Payer: MEDICARE

## 2024-11-04 DIAGNOSIS — E78.5 DYSLIPIDEMIA: ICD-10-CM

## 2024-11-04 RX ORDER — ATORVASTATIN CALCIUM 40 MG/1
40 TABLET, FILM COATED ORAL NIGHTLY
Qty: 90 TABLET | Refills: 0 | Status: SHIPPED | OUTPATIENT
Start: 2024-11-04 | End: 2024-11-18 | Stop reason: SDUPTHER

## 2024-11-04 NOTE — TELEPHONE ENCOUNTER
Is the patient due for a refill? Yes    Was the patient seen the past year? Yes    Date of last office visit: 12.01.2023    Does the patient have an upcoming appointment?  Yes   If yes, When? 12.03.2024    Provider to refill:CW    Does the patient have [employer]P Plus and need 100-day supply? (This applies to ALL medications) Patient does not have SCP